# Patient Record
Sex: FEMALE | Race: WHITE | Employment: OTHER | URBAN - METROPOLITAN AREA
[De-identification: names, ages, dates, MRNs, and addresses within clinical notes are randomized per-mention and may not be internally consistent; named-entity substitution may affect disease eponyms.]

---

## 2020-06-20 ENCOUNTER — APPOINTMENT (OUTPATIENT)
Dept: GENERAL RADIOLOGY | Age: 55
DRG: 115 | End: 2020-06-20
Payer: MEDICAID

## 2020-06-20 ENCOUNTER — APPOINTMENT (OUTPATIENT)
Dept: CT IMAGING | Age: 55
DRG: 115 | End: 2020-06-20
Payer: MEDICAID

## 2020-06-20 ENCOUNTER — APPOINTMENT (OUTPATIENT)
Dept: MRI IMAGING | Age: 55
DRG: 115 | End: 2020-06-20
Payer: MEDICAID

## 2020-06-20 ENCOUNTER — HOSPITAL ENCOUNTER (INPATIENT)
Age: 55
LOS: 5 days | Discharge: HOME HEALTH | DRG: 115 | End: 2020-06-25
Attending: EMERGENCY MEDICINE | Admitting: PLASTIC SURGERY
Payer: MEDICAID

## 2020-06-20 PROBLEM — T14.90XA TRAUMA: Status: ACTIVE | Noted: 2020-06-20

## 2020-06-20 LAB
ABO/RH: NORMAL
ACETAMINOPHEN LEVEL: <5 MCG/ML (ref 10–30)
ALBUMIN SERPL-MCNC: 3.6 G/DL (ref 3.5–5.2)
ALP BLD-CCNC: 87 U/L (ref 35–104)
ALT SERPL-CCNC: 15 U/L (ref 0–32)
AMPHETAMINE SCREEN, URINE: NOT DETECTED
ANION GAP SERPL CALCULATED.3IONS-SCNC: 13 MMOL/L (ref 7–16)
ANTIBODY SCREEN: NORMAL
AST SERPL-CCNC: 31 U/L (ref 0–31)
BARBITURATE SCREEN URINE: NOT DETECTED
BASOPHILS ABSOLUTE: 0.02 E9/L (ref 0–0.2)
BASOPHILS RELATIVE PERCENT: 0.1 % (ref 0–2)
BENZODIAZEPINE SCREEN, URINE: NOT DETECTED
BILIRUB SERPL-MCNC: 0.2 MG/DL (ref 0–1.2)
BUN BLDV-MCNC: 5 MG/DL (ref 6–20)
CALCIUM SERPL-MCNC: 8.9 MG/DL (ref 8.6–10.2)
CANNABINOID SCREEN URINE: POSITIVE
CHLORIDE BLD-SCNC: 84 MMOL/L (ref 98–107)
CO2: 25 MMOL/L (ref 22–29)
COCAINE METABOLITE SCREEN URINE: NOT DETECTED
CREAT SERPL-MCNC: 0.5 MG/DL (ref 0.5–1)
EOSINOPHILS ABSOLUTE: 0.02 E9/L (ref 0.05–0.5)
EOSINOPHILS RELATIVE PERCENT: 0.1 % (ref 0–6)
ETHANOL: <10 MG/DL (ref 0–0.08)
FENTANYL SCREEN, URINE: POSITIVE
GFR AFRICAN AMERICAN: >60
GFR NON-AFRICAN AMERICAN: >60 ML/MIN/1.73
GLUCOSE BLD-MCNC: 132 MG/DL (ref 74–99)
HCT VFR BLD CALC: 27.1 % (ref 34–48)
HEMOGLOBIN: 9.4 G/DL (ref 11.5–15.5)
IMMATURE GRANULOCYTES #: 0.12 E9/L
IMMATURE GRANULOCYTES %: 0.8 % (ref 0–5)
LACTIC ACID, SEPSIS: 0.8 MMOL/L (ref 0.5–1.9)
LYMPHOCYTES ABSOLUTE: 0.89 E9/L (ref 1.5–4)
LYMPHOCYTES RELATIVE PERCENT: 5.8 % (ref 20–42)
Lab: ABNORMAL
MCH RBC QN AUTO: 28.5 PG (ref 26–35)
MCHC RBC AUTO-ENTMCNC: 34.7 % (ref 32–34.5)
MCV RBC AUTO: 82.1 FL (ref 80–99.9)
METHADONE SCREEN, URINE: NOT DETECTED
MONOCYTES ABSOLUTE: 0.47 E9/L (ref 0.1–0.95)
MONOCYTES RELATIVE PERCENT: 3.1 % (ref 2–12)
NEUTROPHILS ABSOLUTE: 13.85 E9/L (ref 1.8–7.3)
NEUTROPHILS RELATIVE PERCENT: 90.1 % (ref 43–80)
OPIATE SCREEN URINE: NOT DETECTED
OXYCODONE URINE: NOT DETECTED
PDW BLD-RTO: 14.3 FL (ref 11.5–15)
PHENCYCLIDINE SCREEN URINE: NOT DETECTED
PLATELET # BLD: 430 E9/L (ref 130–450)
PMV BLD AUTO: 8.3 FL (ref 7–12)
POTASSIUM SERPL-SCNC: 3.9 MMOL/L (ref 3.5–5)
RBC # BLD: 3.3 E12/L (ref 3.5–5.5)
SALICYLATE, SERUM: <0.3 MG/DL (ref 0–30)
SODIUM BLD-SCNC: 122 MMOL/L (ref 132–146)
TOTAL PROTEIN: 6.6 G/DL (ref 6.4–8.3)
TRICYCLIC ANTIDEPRESSANTS SCREEN SERUM: NEGATIVE NG/ML
WBC # BLD: 15.4 E9/L (ref 4.5–11.5)

## 2020-06-20 PROCEDURE — 6360000002 HC RX W HCPCS: Performed by: EMERGENCY MEDICINE

## 2020-06-20 PROCEDURE — 86850 RBC ANTIBODY SCREEN: CPT

## 2020-06-20 PROCEDURE — 83605 ASSAY OF LACTIC ACID: CPT

## 2020-06-20 PROCEDURE — 2580000003 HC RX 258: Performed by: RADIOLOGY

## 2020-06-20 PROCEDURE — 85025 COMPLETE CBC W/AUTO DIFF WBC: CPT

## 2020-06-20 PROCEDURE — 86901 BLOOD TYPING SEROLOGIC RH(D): CPT

## 2020-06-20 PROCEDURE — 70486 CT MAXILLOFACIAL W/O DYE: CPT

## 2020-06-20 PROCEDURE — 72170 X-RAY EXAM OF PELVIS: CPT

## 2020-06-20 PROCEDURE — 6360000004 HC RX CONTRAST MEDICATION: Performed by: RADIOLOGY

## 2020-06-20 PROCEDURE — 73030 X-RAY EXAM OF SHOULDER: CPT

## 2020-06-20 PROCEDURE — 71045 X-RAY EXAM CHEST 1 VIEW: CPT

## 2020-06-20 PROCEDURE — 71260 CT THORAX DX C+: CPT

## 2020-06-20 PROCEDURE — 70450 CT HEAD/BRAIN W/O DYE: CPT

## 2020-06-20 PROCEDURE — 6370000000 HC RX 637 (ALT 250 FOR IP): Performed by: STUDENT IN AN ORGANIZED HEALTH CARE EDUCATION/TRAINING PROGRAM

## 2020-06-20 PROCEDURE — 96374 THER/PROPH/DIAG INJ IV PUSH: CPT

## 2020-06-20 PROCEDURE — 99285 EMERGENCY DEPT VISIT HI MDM: CPT

## 2020-06-20 PROCEDURE — 86900 BLOOD TYPING SEROLOGIC ABO: CPT

## 2020-06-20 PROCEDURE — 87040 BLOOD CULTURE FOR BACTERIA: CPT

## 2020-06-20 PROCEDURE — 6370000000 HC RX 637 (ALT 250 FOR IP): Performed by: EMERGENCY MEDICINE

## 2020-06-20 PROCEDURE — 99254 IP/OBS CNSLTJ NEW/EST MOD 60: CPT | Performed by: PLASTIC SURGERY

## 2020-06-20 PROCEDURE — 2060000000 HC ICU INTERMEDIATE R&B

## 2020-06-20 PROCEDURE — 96375 TX/PRO/DX INJ NEW DRUG ADDON: CPT

## 2020-06-20 PROCEDURE — 72146 MRI CHEST SPINE W/O DYE: CPT

## 2020-06-20 PROCEDURE — 2580000003 HC RX 258: Performed by: EMERGENCY MEDICINE

## 2020-06-20 PROCEDURE — G0480 DRUG TEST DEF 1-7 CLASSES: HCPCS

## 2020-06-20 PROCEDURE — 74177 CT ABD & PELVIS W/CONTRAST: CPT

## 2020-06-20 PROCEDURE — 2580000003 HC RX 258: Performed by: STUDENT IN AN ORGANIZED HEALTH CARE EDUCATION/TRAINING PROGRAM

## 2020-06-20 PROCEDURE — 2700000000 HC OXYGEN THERAPY PER DAY

## 2020-06-20 PROCEDURE — 80307 DRUG TEST PRSMV CHEM ANLYZR: CPT

## 2020-06-20 PROCEDURE — 80053 COMPREHEN METABOLIC PANEL: CPT

## 2020-06-20 RX ORDER — DIVALPROEX SODIUM 500 MG/1
500 TABLET, DELAYED RELEASE ORAL 2 TIMES DAILY
COMMUNITY

## 2020-06-20 RX ORDER — LIDOCAINE 4 G/G
1 PATCH TOPICAL DAILY
Status: DISCONTINUED | OUTPATIENT
Start: 2020-06-20 | End: 2020-06-25 | Stop reason: HOSPADM

## 2020-06-20 RX ORDER — ONDANSETRON 4 MG/1
4 TABLET, FILM COATED ORAL EVERY 8 HOURS PRN
COMMUNITY

## 2020-06-20 RX ORDER — ONDANSETRON 2 MG/ML
4 INJECTION INTRAMUSCULAR; INTRAVENOUS EVERY 6 HOURS PRN
Status: DISCONTINUED | OUTPATIENT
Start: 2020-06-20 | End: 2020-06-25 | Stop reason: HOSPADM

## 2020-06-20 RX ORDER — LISINOPRIL 5 MG/1
5 TABLET ORAL DAILY
COMMUNITY

## 2020-06-20 RX ORDER — ACETAMINOPHEN 325 MG/1
650 TABLET ORAL EVERY 6 HOURS SCHEDULED
Status: DISCONTINUED | OUTPATIENT
Start: 2020-06-20 | End: 2020-06-25 | Stop reason: HOSPADM

## 2020-06-20 RX ORDER — FLUOXETINE HYDROCHLORIDE 20 MG/1
20 CAPSULE ORAL DAILY
COMMUNITY

## 2020-06-20 RX ORDER — SODIUM CHLORIDE 0.9 % (FLUSH) 0.9 %
10 SYRINGE (ML) INJECTION PRN
Status: DISCONTINUED | OUTPATIENT
Start: 2020-06-20 | End: 2020-06-25 | Stop reason: HOSPADM

## 2020-06-20 RX ORDER — OXYCODONE HYDROCHLORIDE 5 MG/1
5 TABLET ORAL EVERY 4 HOURS PRN
Status: DISCONTINUED | OUTPATIENT
Start: 2020-06-20 | End: 2020-06-25 | Stop reason: HOSPADM

## 2020-06-20 RX ORDER — OXYCODONE HYDROCHLORIDE 10 MG/1
10 TABLET ORAL EVERY 4 HOURS PRN
Status: DISCONTINUED | OUTPATIENT
Start: 2020-06-20 | End: 2020-06-25 | Stop reason: HOSPADM

## 2020-06-20 RX ORDER — DOXYCYCLINE HYCLATE 100 MG/1
100 CAPSULE ORAL ONCE
Status: COMPLETED | OUTPATIENT
Start: 2020-06-20 | End: 2020-06-20

## 2020-06-20 RX ORDER — CLONAZEPAM 0.5 MG/1
0.5 TABLET ORAL NIGHTLY
COMMUNITY

## 2020-06-20 RX ORDER — SODIUM CHLORIDE 0.9 % (FLUSH) 0.9 %
10 SYRINGE (ML) INJECTION
Status: COMPLETED | OUTPATIENT
Start: 2020-06-20 | End: 2020-06-20

## 2020-06-20 RX ORDER — METHOCARBAMOL 500 MG/1
1000 TABLET, FILM COATED ORAL 4 TIMES DAILY
Status: DISCONTINUED | OUTPATIENT
Start: 2020-06-20 | End: 2020-06-25 | Stop reason: HOSPADM

## 2020-06-20 RX ORDER — VALACYCLOVIR HYDROCHLORIDE 1 G/1
2000 TABLET, FILM COATED ORAL 2 TIMES DAILY
COMMUNITY

## 2020-06-20 RX ORDER — POLYETHYLENE GLYCOL 3350 17 G/17G
17 POWDER, FOR SOLUTION ORAL DAILY PRN
Status: DISCONTINUED | OUTPATIENT
Start: 2020-06-20 | End: 2020-06-25 | Stop reason: HOSPADM

## 2020-06-20 RX ORDER — KETOROLAC TROMETHAMINE 30 MG/ML
15 INJECTION, SOLUTION INTRAMUSCULAR; INTRAVENOUS ONCE
Status: COMPLETED | OUTPATIENT
Start: 2020-06-20 | End: 2020-06-20

## 2020-06-20 RX ORDER — MONTELUKAST SODIUM 4 MG/1
1 TABLET, CHEWABLE ORAL 2 TIMES DAILY
COMMUNITY

## 2020-06-20 RX ORDER — SODIUM CHLORIDE 0.9 % (FLUSH) 0.9 %
10 SYRINGE (ML) INJECTION EVERY 12 HOURS SCHEDULED
Status: DISCONTINUED | OUTPATIENT
Start: 2020-06-20 | End: 2020-06-25 | Stop reason: HOSPADM

## 2020-06-20 RX ORDER — PROMETHAZINE HYDROCHLORIDE 25 MG/1
12.5 TABLET ORAL EVERY 6 HOURS PRN
Status: DISCONTINUED | OUTPATIENT
Start: 2020-06-20 | End: 2020-06-24

## 2020-06-20 RX ORDER — PANTOPRAZOLE SODIUM 40 MG/1
40 TABLET, DELAYED RELEASE ORAL 2 TIMES DAILY
COMMUNITY

## 2020-06-20 RX ORDER — DIAPER,BRIEF,INFANT-TODD,DISP
EACH MISCELLANEOUS 3 TIMES DAILY
Status: DISCONTINUED | OUTPATIENT
Start: 2020-06-20 | End: 2020-06-25 | Stop reason: HOSPADM

## 2020-06-20 RX ORDER — FERROUS SULFATE 325(65) MG
325 TABLET ORAL 2 TIMES DAILY
COMMUNITY

## 2020-06-20 RX ORDER — LORATADINE 10 MG/1
10 CAPSULE, LIQUID FILLED ORAL DAILY PRN
COMMUNITY

## 2020-06-20 RX ORDER — GUAIFENESIN 400 MG/1
400 TABLET ORAL 4 TIMES DAILY PRN
COMMUNITY

## 2020-06-20 RX ORDER — LAMOTRIGINE 200 MG/1
200 TABLET ORAL 2 TIMES DAILY
COMMUNITY

## 2020-06-20 RX ORDER — SODIUM CHLORIDE 9 MG/ML
INJECTION, SOLUTION INTRAVENOUS CONTINUOUS
Status: DISCONTINUED | OUTPATIENT
Start: 2020-06-20 | End: 2020-06-21

## 2020-06-20 RX ORDER — IBUPROFEN 800 MG/1
800 TABLET ORAL EVERY 8 HOURS PRN
COMMUNITY

## 2020-06-20 RX ORDER — OLANZAPINE 15 MG/1
15 TABLET ORAL NIGHTLY
COMMUNITY

## 2020-06-20 RX ORDER — SENNA AND DOCUSATE SODIUM 50; 8.6 MG/1; MG/1
1 TABLET, FILM COATED ORAL 2 TIMES DAILY
Status: DISCONTINUED | OUTPATIENT
Start: 2020-06-20 | End: 2020-06-25 | Stop reason: HOSPADM

## 2020-06-20 RX ORDER — CETIRIZINE HYDROCHLORIDE 10 MG/1
10 TABLET ORAL DAILY
COMMUNITY

## 2020-06-20 RX ORDER — MIRTAZAPINE 15 MG/1
15 TABLET, FILM COATED ORAL NIGHTLY
COMMUNITY

## 2020-06-20 RX ORDER — CEPHALEXIN 500 MG/1
500 CAPSULE ORAL EVERY 6 HOURS SCHEDULED
Status: COMPLETED | OUTPATIENT
Start: 2020-06-20 | End: 2020-06-25

## 2020-06-20 RX ADMIN — CEPHALEXIN 500 MG: 500 CAPSULE ORAL at 20:43

## 2020-06-20 RX ADMIN — METHOCARBAMOL TABLETS 1000 MG: 500 TABLET, COATED ORAL at 17:26

## 2020-06-20 RX ADMIN — ACETAMINOPHEN 650 MG: 325 TABLET ORAL at 17:26

## 2020-06-20 RX ADMIN — OXYCODONE 5 MG: 5 TABLET ORAL at 16:14

## 2020-06-20 RX ADMIN — BACITRACIN ZINC: 500 OINTMENT TOPICAL at 20:42

## 2020-06-20 RX ADMIN — METHOCARBAMOL TABLETS 1000 MG: 500 TABLET, COATED ORAL at 20:43

## 2020-06-20 RX ADMIN — DOXYCYCLINE HYCLATE 100 MG: 100 CAPSULE ORAL at 11:28

## 2020-06-20 RX ADMIN — IOPAMIDOL 110 ML: 755 INJECTION, SOLUTION INTRAVENOUS at 12:20

## 2020-06-20 RX ADMIN — SODIUM CHLORIDE: 9 INJECTION, SOLUTION INTRAVENOUS at 18:14

## 2020-06-20 RX ADMIN — KETOROLAC TROMETHAMINE 15 MG: 30 INJECTION, SOLUTION INTRAMUSCULAR at 09:15

## 2020-06-20 RX ADMIN — SODIUM CHLORIDE, PRESERVATIVE FREE 10 ML: 5 INJECTION INTRAVENOUS at 11:22

## 2020-06-20 RX ADMIN — Medication 10 ML: at 12:21

## 2020-06-20 RX ADMIN — DOCUSATE SODIUM 50MG AND SENNOSIDES 8.6MG 1 TABLET: 8.6; 5 TABLET, FILM COATED ORAL at 20:42

## 2020-06-20 RX ADMIN — CEFTRIAXONE 2 G: 2 INJECTION, POWDER, FOR SOLUTION INTRAMUSCULAR; INTRAVENOUS at 11:25

## 2020-06-20 RX ADMIN — SODIUM CHLORIDE, PRESERVATIVE FREE 10 ML: 5 INJECTION INTRAVENOUS at 09:16

## 2020-06-20 RX ADMIN — BACITRACIN ZINC: 500 OINTMENT TOPICAL at 18:29

## 2020-06-20 SDOH — HEALTH STABILITY: MENTAL HEALTH: HOW OFTEN DO YOU HAVE A DRINK CONTAINING ALCOHOL?: NEVER

## 2020-06-20 ASSESSMENT — PAIN SCALES - GENERAL
PAINLEVEL_OUTOF10: 10
PAINLEVEL_OUTOF10: 9
PAINLEVEL_OUTOF10: 10
PAINLEVEL_OUTOF10: 10
PAINLEVEL_OUTOF10: 0
PAINLEVEL_OUTOF10: 10

## 2020-06-20 ASSESSMENT — PAIN DESCRIPTION - LOCATION
LOCATION: EYE;HEAD
LOCATION: EYE;HEAD

## 2020-06-20 ASSESSMENT — PAIN DESCRIPTION - ONSET
ONSET: ON-GOING
ONSET: ON-GOING

## 2020-06-20 ASSESSMENT — PAIN DESCRIPTION - DESCRIPTORS
DESCRIPTORS: ACHING;CONSTANT;DISCOMFORT
DESCRIPTORS: ACHING;CONSTANT;DISCOMFORT

## 2020-06-20 ASSESSMENT — PAIN DESCRIPTION - FREQUENCY
FREQUENCY: CONTINUOUS
FREQUENCY: CONTINUOUS

## 2020-06-20 ASSESSMENT — PAIN DESCRIPTION - PAIN TYPE
TYPE: ACUTE PAIN
TYPE: ACUTE PAIN

## 2020-06-20 NOTE — ED PROVIDER NOTES
change  Repeat physical examination is not changed    Time: 0800  Re-evaluation. Patients symptoms show no change  Repeat physical examination is not changed      Sepsis Re-examination  6/20/20   0900am EDT          Vital Signs:   Vitals:    06/20/20 2355 06/21/20 0515 06/21/20 0800 06/21/20 0815   BP: (!) 174/80 (!) 155/81 125/60    Pulse: 81 81 82    Resp: 20 18 16    Temp: 98.8 °F (37.1 °C) 98.5 °F (36.9 °C) 96.6 °F (35.9 °C)    TempSrc: Axillary Temporal Temporal    SpO2: 98% 99%  99%   Weight:  146 lb (66.2 kg)     Height:  5' 5\" (1.651 m)       Card/Pulm:  Rhythm: normal rate. Heart Sounds: Normal S1, S2. rhonchi. Capillary Refill: normal.  Radial Pulse:  present 2+. Skin:  Warm. This patient's ED course included: a personal history and physicial examination, multiple bedside re-evaluations, IV medications, cardiac monitoring, continuous pulse oximetry and complex medical decision making and emergency management    This patient has remained hemodynamically stable and been closely monitored during their ED course. Consultations:  Trauma      Critical Care: Please note that the withdrawal or failure to initiate urgent interventions for this patient would likely result in a life threatening deterioration or permanent disability. Accordingly this patient received 31 minutes of critical care time, excluding separately billable procedures. Counseling: The emergency provider has spoken with the patient and discussed todays results, in addition to providing specific details for the plan of care and counseling regarding the diagnosis and prognosis. Questions are answered at this time and they are agreeable with the plan.       --------------------------------- IMPRESSION AND DISPOSITION ---------------------------------    IMPRESSION  1. Trauma    2. Facial injury, initial encounter    3.  Closed fracture of thoracic vertebra, unspecified fracture morphology, unspecified thoracic

## 2020-06-20 NOTE — CONSULTS
PLASTIC SURGERY  CONSULT NOTE  6/20/2020    Physician Consulted: Dr. Sophia Turcios  Reason for Consult: Left orbital wall fracture, nasal bone fractures  Referring Physician: Dr. Anibal GTZ  Montrell Vanessa is a 50 y.o. female who presents for evaluation of left orbital wall fracture, nasal bone fractures. Patient fell down 5 carpeted steps. She states she doesn't remember the fall she had gotten up to get ice cream last night and the next thing she knew she was on the floor. Does not think she was dizzy prior to fall. Patient is somnolent but arousable. She states everything hurts. She was taken to [facility] where she was found to have a left orbital fracture, nasal bone fractures, and a T9 compression fracture. She was transferred to [facility]. Due to her somnolence a repeat CT head was obtained, negative for intracranial hemorrhage. Patient complains of mild blurry vision left eye. Denies diplopia. No past medical history on file. No past surgical history on file. Medications Prior to Admission:    Prior to Admission medications    Not on File       Allergies no known allergies    No family history on file. Social History     Tobacco Use    Smoking status: Not on file   Substance Use Topics    Alcohol use: Not on file    Drug use: Not on file         Review of Systems   General ROS: negative  Hematological and Lymphatic ROS: negative  Respiratory ROS: negative  Cardiovascular ROS: negative  Gastrointestinal ROS: negative  Genito-Urinary ROS: negative  Musculoskeletal ROS: pain every where      PHYSICAL EXAM:    Vitals:    06/20/20 1000   BP: (!) 155/90   Pulse: 80   Resp: 16   Temp:    SpO2: 94%       General Appearance:  Awake, alert, oriented, in no acute distress  Skin:  Ecchymosis and edema periorbital  Head/face:  Laceration to forehead s/p repair with nylon sutures, bilateral periorbital edema and ecchymosis. Nasal ecchymosis, tender to palpation.   Eyes:  PERRL, H test intact, minimal pain left NUMBER OF IMAGES:  273 INDICATION:  trauma, fall trauma, fall COMPARISON: None Technique:  Spiral CT acquisition of the chest from the thoracic inlet to the upper abdomen with contrast. Contrast: 110 mL of Isovue-370 injected intravenously. CT Dose-Length Product:  298 mGy*cm CT Dose Reduction Employed: Yes  RESULT: Limitations:  None. Lines, tubes, and devices:  None. Lung parenchyma and pleura: Central airways are patent. There are scattered centrilobular nodules and groundglass opacities within the right lung, and mostly within the right lower lobe. These appear to have an infectious/inflammatory appearance. Alternatively, aspiration could account for these findings. For reference, nodule in the right lower lobe measures 7 mm (series 3 image 41), and within the right upper lobe measures 6 mm (series 3 image 10). There is bibasilar atelectasis. No pleural effusion or pneumothorax. Thoracic inlet, heart, and mediastinum:  Visualized thyroid is unremarkable. No lymphadenopathy in the axillary, mediastinal, or hilar regions. The thoracic aorta and main pulmonary artery are normal in caliber. The cardiac chambers are normal in size. No coronary artery atherosclerotic calcifications are noted, although the study is not optimized for coronary assessment. No pericardial effusion or thickening. Bones and soft tissues: There is a compression deformity involving the T9 vertebral body, with approximately 50% loss of height. There is involvement of the posterior elements on the right, with fracture extending into the pars interarticulares. There are multiple left-sided rib fractures of the left third, fourth, fifth, sixth ribs. Upper abdomen: This will be dictated separately      Compression deformity of the T9 vertebral body, with approximately 50% loss of height. There is involvement of the posterior elements on the right, with fracture extending into the pars interarticulares.  Multiple left-sided rib fractures from the

## 2020-06-20 NOTE — ED NOTES
Set one of two blood culture obtained by david sheppard via right antecubital at 1110 and set two of two obtained at 1120 via right hand by valarie, rn-pt tolerated well. Alert to verbal stimuli but smi unreliable at this time.       Shiv Joiner RN  06/20/20 1120

## 2020-06-20 NOTE — PROGRESS NOTES
Cervical Spine C Collar Clearance -  Patient CT Spine Imaging normal.  Patient does not complain of Cervical Spine tenderness upon palpation. Patients C-Spine ranged. C-spine clear, no need for C-Collar.     Electronically signed by Yesenia Padgett MD on 6/20/2020 at 1:12 PM

## 2020-06-21 LAB
ANION GAP SERPL CALCULATED.3IONS-SCNC: 11 MMOL/L (ref 7–16)
BASOPHILS ABSOLUTE: 0.02 E9/L (ref 0–0.2)
BASOPHILS RELATIVE PERCENT: 0.3 % (ref 0–2)
BUN BLDV-MCNC: 3 MG/DL (ref 6–20)
CALCIUM SERPL-MCNC: 9 MG/DL (ref 8.6–10.2)
CHLORIDE BLD-SCNC: 94 MMOL/L (ref 98–107)
CO2: 24 MMOL/L (ref 22–29)
CREAT SERPL-MCNC: 0.5 MG/DL (ref 0.5–1)
EOSINOPHILS ABSOLUTE: 0.09 E9/L (ref 0.05–0.5)
EOSINOPHILS RELATIVE PERCENT: 1.4 % (ref 0–6)
GFR AFRICAN AMERICAN: >60
GFR NON-AFRICAN AMERICAN: >60 ML/MIN/1.73
GLUCOSE BLD-MCNC: 96 MG/DL (ref 74–99)
HCT VFR BLD CALC: 30.7 % (ref 34–48)
HEMOGLOBIN: 9.8 G/DL (ref 11.5–15.5)
IMMATURE GRANULOCYTES #: 0.04 E9/L
IMMATURE GRANULOCYTES %: 0.6 % (ref 0–5)
LYMPHOCYTES ABSOLUTE: 1.21 E9/L (ref 1.5–4)
LYMPHOCYTES RELATIVE PERCENT: 18.8 % (ref 20–42)
MCH RBC QN AUTO: 27.5 PG (ref 26–35)
MCHC RBC AUTO-ENTMCNC: 31.9 % (ref 32–34.5)
MCV RBC AUTO: 86.2 FL (ref 80–99.9)
MONOCYTES ABSOLUTE: 0.52 E9/L (ref 0.1–0.95)
MONOCYTES RELATIVE PERCENT: 8.1 % (ref 2–12)
NEUTROPHILS ABSOLUTE: 4.55 E9/L (ref 1.8–7.3)
NEUTROPHILS RELATIVE PERCENT: 70.8 % (ref 43–80)
PDW BLD-RTO: 14.6 FL (ref 11.5–15)
PLATELET # BLD: 398 E9/L (ref 130–450)
PMV BLD AUTO: 8.5 FL (ref 7–12)
POTASSIUM REFLEX MAGNESIUM: 4.1 MMOL/L (ref 3.5–5)
RBC # BLD: 3.56 E12/L (ref 3.5–5.5)
SODIUM BLD-SCNC: 129 MMOL/L (ref 132–146)
SODIUM BLD-SCNC: 131 MMOL/L (ref 132–146)
SODIUM BLD-SCNC: 133 MMOL/L (ref 132–146)
WBC # BLD: 6.4 E9/L (ref 4.5–11.5)

## 2020-06-21 PROCEDURE — 2580000003 HC RX 258: Performed by: SURGERY

## 2020-06-21 PROCEDURE — 2580000003 HC RX 258: Performed by: STUDENT IN AN ORGANIZED HEALTH CARE EDUCATION/TRAINING PROGRAM

## 2020-06-21 PROCEDURE — 6360000002 HC RX W HCPCS: Performed by: STUDENT IN AN ORGANIZED HEALTH CARE EDUCATION/TRAINING PROGRAM

## 2020-06-21 PROCEDURE — 36415 COLL VENOUS BLD VENIPUNCTURE: CPT

## 2020-06-21 PROCEDURE — 2060000000 HC ICU INTERMEDIATE R&B

## 2020-06-21 PROCEDURE — 94640 AIRWAY INHALATION TREATMENT: CPT

## 2020-06-21 PROCEDURE — 2700000000 HC OXYGEN THERAPY PER DAY

## 2020-06-21 PROCEDURE — 84295 ASSAY OF SERUM SODIUM: CPT

## 2020-06-21 PROCEDURE — 80048 BASIC METABOLIC PNL TOTAL CA: CPT

## 2020-06-21 PROCEDURE — 6370000000 HC RX 637 (ALT 250 FOR IP): Performed by: SURGERY

## 2020-06-21 PROCEDURE — 99233 SBSQ HOSP IP/OBS HIGH 50: CPT | Performed by: SURGERY

## 2020-06-21 PROCEDURE — 6360000002 HC RX W HCPCS: Performed by: SURGERY

## 2020-06-21 PROCEDURE — 6370000000 HC RX 637 (ALT 250 FOR IP): Performed by: STUDENT IN AN ORGANIZED HEALTH CARE EDUCATION/TRAINING PROGRAM

## 2020-06-21 PROCEDURE — 94664 DEMO&/EVAL PT USE INHALER: CPT

## 2020-06-21 PROCEDURE — 85025 COMPLETE CBC W/AUTO DIFF WBC: CPT

## 2020-06-21 RX ORDER — MIRTAZAPINE 15 MG/1
15 TABLET, FILM COATED ORAL NIGHTLY
Status: DISCONTINUED | OUTPATIENT
Start: 2020-06-21 | End: 2020-06-25 | Stop reason: HOSPADM

## 2020-06-21 RX ORDER — SODIUM CHLORIDE, SODIUM LACTATE, POTASSIUM CHLORIDE, CALCIUM CHLORIDE 600; 310; 30; 20 MG/100ML; MG/100ML; MG/100ML; MG/100ML
INJECTION, SOLUTION INTRAVENOUS CONTINUOUS
Status: DISCONTINUED | OUTPATIENT
Start: 2020-06-21 | End: 2020-06-22

## 2020-06-21 RX ORDER — DIVALPROEX SODIUM 500 MG/1
500 TABLET, DELAYED RELEASE ORAL 2 TIMES DAILY
Status: DISCONTINUED | OUTPATIENT
Start: 2020-06-21 | End: 2020-06-25 | Stop reason: HOSPADM

## 2020-06-21 RX ORDER — FLUOXETINE HYDROCHLORIDE 20 MG/1
20 CAPSULE ORAL DAILY
Status: DISCONTINUED | OUTPATIENT
Start: 2020-06-21 | End: 2020-06-25 | Stop reason: HOSPADM

## 2020-06-21 RX ORDER — LAMOTRIGINE 100 MG/1
200 TABLET ORAL 2 TIMES DAILY
Status: DISCONTINUED | OUTPATIENT
Start: 2020-06-21 | End: 2020-06-25 | Stop reason: HOSPADM

## 2020-06-21 RX ORDER — CHOLESTYRAMINE LIGHT 4 G/5.7G
4 POWDER, FOR SUSPENSION ORAL DAILY
Status: DISCONTINUED | OUTPATIENT
Start: 2020-06-21 | End: 2020-06-25 | Stop reason: HOSPADM

## 2020-06-21 RX ORDER — CLONAZEPAM 0.5 MG/1
0.5 TABLET ORAL NIGHTLY
Status: DISCONTINUED | OUTPATIENT
Start: 2020-06-21 | End: 2020-06-25 | Stop reason: HOSPADM

## 2020-06-21 RX ORDER — OLANZAPINE 5 MG/1
15 TABLET ORAL NIGHTLY
Status: DISCONTINUED | OUTPATIENT
Start: 2020-06-21 | End: 2020-06-25 | Stop reason: HOSPADM

## 2020-06-21 RX ORDER — CETIRIZINE HYDROCHLORIDE 10 MG/1
10 TABLET ORAL DAILY
Status: DISCONTINUED | OUTPATIENT
Start: 2020-06-21 | End: 2020-06-25 | Stop reason: HOSPADM

## 2020-06-21 RX ORDER — PANTOPRAZOLE SODIUM 40 MG/1
40 TABLET, DELAYED RELEASE ORAL 2 TIMES DAILY
Status: DISCONTINUED | OUTPATIENT
Start: 2020-06-21 | End: 2020-06-25 | Stop reason: HOSPADM

## 2020-06-21 RX ORDER — ACYCLOVIR 800 MG/1
800 TABLET ORAL
Status: DISCONTINUED | OUTPATIENT
Start: 2020-06-21 | End: 2020-06-25 | Stop reason: HOSPADM

## 2020-06-21 RX ADMIN — ONDANSETRON 4 MG: 2 INJECTION INTRAMUSCULAR; INTRAVENOUS at 19:22

## 2020-06-21 RX ADMIN — CEPHALEXIN 500 MG: 500 CAPSULE ORAL at 12:34

## 2020-06-21 RX ADMIN — PANTOPRAZOLE SODIUM 40 MG: 40 TABLET, DELAYED RELEASE ORAL at 23:18

## 2020-06-21 RX ADMIN — CLONAZEPAM 0.5 MG: 0.5 TABLET ORAL at 23:17

## 2020-06-21 RX ADMIN — OXYCODONE HYDROCHLORIDE 10 MG: 10 TABLET ORAL at 21:59

## 2020-06-21 RX ADMIN — ACETAMINOPHEN 650 MG: 325 TABLET ORAL at 01:18

## 2020-06-21 RX ADMIN — ACETAMINOPHEN 650 MG: 325 TABLET ORAL at 12:34

## 2020-06-21 RX ADMIN — BACITRACIN ZINC: 500 OINTMENT TOPICAL at 09:50

## 2020-06-21 RX ADMIN — ACYCLOVIR 800 MG: 800 TABLET ORAL at 19:45

## 2020-06-21 RX ADMIN — FLUOXETINE HYDROCHLORIDE 20 MG: 20 CAPSULE ORAL at 23:18

## 2020-06-21 RX ADMIN — IPRATROPIUM BROMIDE 0.5 MG: 0.5 SOLUTION RESPIRATORY (INHALATION) at 21:21

## 2020-06-21 RX ADMIN — OXYCODONE HYDROCHLORIDE 10 MG: 10 TABLET ORAL at 10:50

## 2020-06-21 RX ADMIN — METHOCARBAMOL TABLETS 1000 MG: 500 TABLET, COATED ORAL at 18:10

## 2020-06-21 RX ADMIN — BACITRACIN ZINC: 500 OINTMENT TOPICAL at 14:52

## 2020-06-21 RX ADMIN — ACETAMINOPHEN 650 MG: 325 TABLET ORAL at 23:17

## 2020-06-21 RX ADMIN — OXYCODONE HYDROCHLORIDE 10 MG: 10 TABLET ORAL at 17:19

## 2020-06-21 RX ADMIN — ACETAMINOPHEN 650 MG: 325 TABLET ORAL at 18:10

## 2020-06-21 RX ADMIN — ACETAMINOPHEN 650 MG: 325 TABLET ORAL at 05:28

## 2020-06-21 RX ADMIN — CHOLESTYRAMINE 4 G: 4 POWDER, FOR SUSPENSION ORAL at 23:19

## 2020-06-21 RX ADMIN — SODIUM CHLORIDE, PRESERVATIVE FREE 10 ML: 5 INJECTION INTRAVENOUS at 23:19

## 2020-06-21 RX ADMIN — METHOCARBAMOL TABLETS 1000 MG: 500 TABLET, COATED ORAL at 23:18

## 2020-06-21 RX ADMIN — CEPHALEXIN 500 MG: 500 CAPSULE ORAL at 05:28

## 2020-06-21 RX ADMIN — CEPHALEXIN 500 MG: 500 CAPSULE ORAL at 01:18

## 2020-06-21 RX ADMIN — LAMOTRIGINE 200 MG: 100 TABLET ORAL at 23:18

## 2020-06-21 RX ADMIN — DOCUSATE SODIUM 50MG AND SENNOSIDES 8.6MG 1 TABLET: 8.6; 5 TABLET, FILM COATED ORAL at 23:16

## 2020-06-21 RX ADMIN — OXYCODONE HYDROCHLORIDE 10 MG: 10 TABLET ORAL at 06:34

## 2020-06-21 RX ADMIN — METHOCARBAMOL TABLETS 1000 MG: 500 TABLET, COATED ORAL at 09:50

## 2020-06-21 RX ADMIN — CETIRIZINE HYDROCHLORIDE 10 MG: 10 TABLET, FILM COATED ORAL at 23:18

## 2020-06-21 RX ADMIN — CEPHALEXIN 500 MG: 500 CAPSULE ORAL at 23:17

## 2020-06-21 RX ADMIN — CEPHALEXIN 500 MG: 500 CAPSULE ORAL at 18:10

## 2020-06-21 RX ADMIN — BACITRACIN ZINC: 500 OINTMENT TOPICAL at 23:19

## 2020-06-21 RX ADMIN — OXYCODONE HYDROCHLORIDE 10 MG: 10 TABLET ORAL at 01:15

## 2020-06-21 RX ADMIN — OLANZAPINE 15 MG: 5 TABLET, FILM COATED ORAL at 23:18

## 2020-06-21 RX ADMIN — DIVALPROEX SODIUM 500 MG: 250 TABLET, DELAYED RELEASE ORAL at 23:18

## 2020-06-21 RX ADMIN — METHOCARBAMOL TABLETS 1000 MG: 500 TABLET, COATED ORAL at 14:52

## 2020-06-21 RX ADMIN — DOCUSATE SODIUM 50MG AND SENNOSIDES 8.6MG 1 TABLET: 8.6; 5 TABLET, FILM COATED ORAL at 09:50

## 2020-06-21 RX ADMIN — SODIUM CHLORIDE: 9 INJECTION, SOLUTION INTRAVENOUS at 08:27

## 2020-06-21 RX ADMIN — ACYCLOVIR 800 MG: 800 TABLET ORAL at 23:16

## 2020-06-21 RX ADMIN — MIRTAZAPINE 15 MG: 15 TABLET, FILM COATED ORAL at 23:19

## 2020-06-21 RX ADMIN — SODIUM CHLORIDE, POTASSIUM CHLORIDE, SODIUM LACTATE AND CALCIUM CHLORIDE: 600; 310; 30; 20 INJECTION, SOLUTION INTRAVENOUS at 10:55

## 2020-06-21 ASSESSMENT — PAIN SCALES - GENERAL
PAINLEVEL_OUTOF10: 10
PAINLEVEL_OUTOF10: 10
PAINLEVEL_OUTOF10: 9
PAINLEVEL_OUTOF10: 10
PAINLEVEL_OUTOF10: 0
PAINLEVEL_OUTOF10: 10

## 2020-06-21 ASSESSMENT — PAIN DESCRIPTION - LOCATION
LOCATION: EYE;FACE;NOSE
LOCATION: EYE;FACE
LOCATION: EYE;FACE;NOSE
LOCATION: EYE;FACE;NOSE

## 2020-06-21 ASSESSMENT — PAIN DESCRIPTION - ONSET
ONSET: ON-GOING

## 2020-06-21 ASSESSMENT — PAIN DESCRIPTION - ORIENTATION
ORIENTATION: RIGHT;LEFT

## 2020-06-21 ASSESSMENT — PAIN DESCRIPTION - PROGRESSION
CLINICAL_PROGRESSION: NOT CHANGED

## 2020-06-21 ASSESSMENT — PAIN DESCRIPTION - PAIN TYPE
TYPE: ACUTE PAIN

## 2020-06-21 ASSESSMENT — PAIN DESCRIPTION - FREQUENCY
FREQUENCY: CONTINUOUS

## 2020-06-21 ASSESSMENT — PAIN DESCRIPTION - DESCRIPTORS
DESCRIPTORS: ACHING;DISCOMFORT

## 2020-06-21 NOTE — PROGRESS NOTES
Trauma Tertiary Survey    Admit Date: 6/20/2020    Fall distance 5 feet    CC:    Chief Complaint   Patient presents with    Consultation     Pt fell 5-6 steps. Pt is transfer from [facility]. Multiple facial fx and T9 compression fx. Alcohol pre-screening:  How many times in the past year have you had 4 or more drinks in a day?  none    Subjective:       No new complaints overnight feels well. Objective:     Patient Vitals for the past 8 hrs:   BP Temp Temp src Pulse Resp SpO2 Height Weight   06/21/20 0815 -- -- -- -- -- 99 % -- --   06/21/20 0800 125/60 96.6 °F (35.9 °C) Temporal 82 16 -- -- --   06/21/20 0515 (!) 155/81 98.5 °F (36.9 °C) Temporal 81 18 99 % 5' 5\" (1.651 m) 146 lb (66.2 kg)       I/O last 3 completed shifts: In: 843.8 [I.V.:843.8]  Out: 1700 [Urine:1700]  No intake/output data recorded. Radiology:  MRI THORACIC SPINE WO CONTRAST   Final Result   Within the limits of this study, the compression fracture in the T9   vertebral body is likely acute or subacute. If indicated, sagittal   STIR sequence may be obtained for further evaluation. XR SHOULDER RIGHT (MIN 2 VIEWS)   Final Result   No acute osseous abnormality seen. CT Chest W Contrast   Final Result      Compression deformity of the T9 vertebral body, with approximately 50%   loss of height. There is involvement of the posterior elements on the   right, with fracture extending into the pars interarticulares. Multiple left-sided rib fractures from the 3rd through the 6th ribs. Centrilobular nodules and groundglass opacities asymmetrically   involving the right lung, and mostly the right lower lobe. These have   an infectious/inflammatory appearance, although, aspiration could   account for these findings. Follow-up to resolution, to rule out any   underlying process (and insure resolution of these nodules).                   CT ABDOMEN PELVIS W IV CONTRAST Additional Contrast? None   Final Result No CT evidence of acute traumatic abnormality in the abdomen or   pelvis. Soft tissue bruise overlying the right greater trochanter. Scattered enlarged mesenteric lymph nodes. Small fat-containing anterior abdominal wall hernia. XR PELVIS (1-2 VIEWS)   Final Result   Slight irregularity in the right superior and inferior pubic rami may   be indicative of nondisplaced fractures versus artifact from overlying   tissues. XR CHEST PORTABLE   Final Result   Bibasilar opacities. Differential includes atelectasis, infection,   and/or layering pleural effusions. CT Head WO Contrast   Final Result      1. No acute intracranial hemorrhage is identified. 2. Soft tissue edema is noted overlying the left frontal convexity. There is partial opacification of the left frontal sinuses, the   ethmoid air cells, and the right sphenoid sinus. CT Facial Bones WO Contrast   Final Result   Addendum 1 of 1   Addendum:      Multiple nasal fractures are noted. Final          PHYSICAL EXAM:   GCS:  4 - Opens eyes on own   6 - Follows simple motor commands  5 - Alert and oriented    Pupil size: Left 4 mm     Right 4 mm  Pupil reaction: Yes  Wiggles fingers: Left Yes     Right Yes  Wiggles toes: Left Yes     Right Yes  Plantar flexion: Left normal     Right normal    PHYSICAL EXAM  General: No apparent distress, comfortable, orbital ecchymosis bilaterally, pupils round equal reactive regular rate  HEENT: Trachea midline, Pupils equal round  Chest: Respiratory effort was normal with no retractions or use of accessory muscles. Cardiovascular: Normal rhythm  Abdomen:  Soft and non distended.   No tenderness, guarding, rebound, or rigidity  Extremities: Moves all 4 extremeties, No pedal edema, diffuse abrasions    Spine:       Spine Tenderness ROM   Cervical 5 /10 Na   Thoracic 0 /10 Normal   Lumbar 0 /10 Normal     Musculoskeletal:    Joint Tenderness

## 2020-06-21 NOTE — DISCHARGE SUMMARY
centrilobular nodules and groundglass opacities within the right lung, and mostly within the right lower lobe. These appear to have an infectious/inflammatory appearance. Alternatively, aspiration could account for these findings. For reference, nodule in the right lower lobe measures 7 mm (series 3 image 41), and within the right upper lobe measures 6 mm (series 3 image 10). There is bibasilar atelectasis. No pleural effusion or pneumothorax. Thoracic inlet, heart, and mediastinum:  Visualized thyroid is unremarkable. No lymphadenopathy in the axillary, mediastinal, or hilar regions. The thoracic aorta and main pulmonary artery are normal in caliber. The cardiac chambers are normal in size. No coronary artery atherosclerotic calcifications are noted, although the study is not optimized for coronary assessment. No pericardial effusion or thickening. Bones and soft tissues: There is a compression deformity involving the T9 vertebral body, with approximately 50% loss of height. There is involvement of the posterior elements on the right, with fracture extending into the pars interarticulares. There are multiple left-sided rib fractures of the left third, fourth, fifth, sixth ribs. Upper abdomen: This will be dictated separately      Compression deformity of the T9 vertebral body, with approximately 50% loss of height. There is involvement of the posterior elements on the right, with fracture extending into the pars interarticulares. Multiple left-sided rib fractures from the 3rd through the 6th ribs. Centrilobular nodules and groundglass opacities asymmetrically involving the right lung, and mostly the right lower lobe. These have an infectious/inflammatory appearance, although, aspiration could account for these findings. Follow-up to resolution, to rule out any underlying process (and insure resolution of these nodules).      Mri Thoracic Spine Wo Contrast    Result Date: 2020  Patient MRN: [id number] : splenomegaly. Pancreas: No mass or duct dilation. Adrenals: No mass. Kidneys: No enhancing renal mass. No hydronephrosis. No laceration. GI tract: No dilation or wall thickening. Lymph nodes: Scattered enlarged mesenteric lymph nodes. Mesentery/Peritoneum: No ascites or mass. Vasculature: The celiac axis and SMA are patent. The portal vein and branches, splenic vein, SMV, and hepatic veins are patent. No abdominal aortic or iliac artery aneurysm. Pelvis: IUD in place. No ascites. Mason catheter within the urinary bladder. Bones/Soft Tissues: Irregularity in the right inferior pubic ramus/ischium is related to remote injury. Soft tissue bruise within the soft tissues overlying the right greater trochanter. Small fat-containing anterior abdominal wall hernia. Lower thorax: This will be reported separately. No CT evidence of acute traumatic abnormality in the abdomen or pelvis. Soft tissue bruise overlying the right greater trochanter. Scattered enlarged mesenteric lymph nodes. Small fat-containing anterior abdominal wall hernia. Xr Chest Portable    Result Date: 2020  Patient MRN:  [id number] : 1965 Age: 50 years Gender: Female Order Date:  2020 9:45 AM EXAM: XR CHEST PORTABLE NUMBER OF IMAGES:    1 INDICATION:  Trauma, hypoxia Trauma, hypoxia COMPARISON: None TECHNIQUE: AP view of the chest. FINDINGS: Cardiomediastinal silhouette and pulmonary vasculature are normal. Bibasilar opacities noted. No pneumothorax seen. There are degenerative changes in the shoulders and spine. Bibasilar opacities. Differential includes atelectasis, infection, and/or layering pleural effusions.        Discharge Exam:  Gen - no apparent distress   Neuro - Awake, alert, attentive    HEENT - PERRL 3mm, b/l periorbital ecchymosis,   Lungs - non labored, BS clear    Heart - RR   Abdomen - snt   Spine -   Moderate t spine tenderness   Ext- nvi        Disposition: home    In process/preliminary results:  Outstanding Order Results     No orders found from 5/22/2020 to 6/21/2020. Patient Instructions:   Discharge Medication List as of 6/25/2020  3:17 PM           Details   lidocaine 4 % external patch Place 1 patch onto the skin daily, Transdermal, DAILY Starting Fri 6/26/2020, Disp-1 box, R-1, Print      bacitracin zinc 500 UNIT/GM ointment Apply topically 2 times daily. , Disp-30 g, R-1, Print      methocarbamol (ROBAXIN) 500 MG tablet Take 2 tablets by mouth 4 times daily for 10 days, Disp-80 tablet, R-0Print      oxyCODONE HCl (OXY-IR) 10 MG immediate release tablet Take 1 tablet by mouth every 8 hours as needed for Pain for up to 7 days. , Disp-21 tablet, R-0Print              Details   ferrous sulfate (IRON 325) 325 (65 Fe) MG tablet Take 325 mg by mouth 2 times dailyHistorical Med      lamoTRIgine (LAMICTAL) 200 MG tablet Take 200 mg by mouth 2 times dailyHistorical Med      OLANZapine (ZYPREXA) 15 MG tablet Take 15 mg by mouth nightlyHistorical Med      mirtazapine (REMERON) 15 MG tablet Take 15 mg by mouth nightlyHistorical Med      guaiFENesin 400 MG tablet Take 400 mg by mouth 4 times daily as needed for CoughHistorical Med      cetirizine (ZYRTEC) 10 MG tablet Take 10 mg by mouth dailyHistorical Med      tiotropium (SPIRIVA RESPIMAT) 2.5 MCG/ACT AERS inhaler Inhale 2 puffs into the lungs dailyHistorical Med      ibuprofen (ADVIL;MOTRIN) 800 MG tablet Take 800 mg by mouth every 8 hours as needed for PainHistorical Med      loratadine (CLARITIN) 10 MG capsule Take 10 mg by mouth daily as neededHistorical Med      pantoprazole (PROTONIX) 40 MG tablet Take 40 mg by mouth 2 times dailyHistorical Med      clonazePAM (KLONOPIN) 0.5 MG tablet Take 0.5 mg by mouth nightly. Historical Med      valACYclovir (VALTREX) 1 g tablet Take 2,000 mg by mouth 2 times dailyHistorical Med      colestipol (COLESTID) 1 g tablet Take 1 g by mouth 2 times dailyHistorical Med      FLUoxetine (PROZAC) 20 MG capsule Take 20 mg by mouth (Senokot-S), or Miralax. [x]  You may take Ibuprofen (over the counter) as directed for mild pain. --You may take up to 800mg every 8 hours for pain, please take with food or milk. []  Do not take any other acetaminophen (Tylenol) products if you are taking Percocet or Norco, as these contain Tylenol. --Do NOT take more than 4000mg of Tylenol in 24h. OPIOID MEDICATION INSTRUCTIONS  Read the medication guide that is included with your prescription. Take your medication exactly as prescribed. Store medication away from children and in a safe place. Do NOT share your medication with others. Do NOT take medication unless it is prescribed for you. Do NOT drink alcohol while taking opioids (I.e., Norco, Percocet, Oxycodone, etc). Discuss with the Trauma Clinic staff if the dose of medication you are taking does not control your pain and any side effects that you may be having. CALL 911 OR YOUR LOCAL EMERGENCY SERVICE:  --If you take too much medication  --If you have trouble breathing or shortness of breath  --A child has taken this medication. WORK:  You may not return to work until you receive follow-up with the Trauma Clinic or clearance by all consultants. Call the trauma clinic for any of the following or for questions/concerns;  --fever over 101F  --redness, swelling, hardness or warmth at the wound site(s). --Unrelieved nausea/vomiting  --Foul smelling or cloudy drainage at the wound site(s)  --Unrelieved pain or increase in pain  --Increase in shortness of breath    Follow-up:  Trauma Clinic: [phone] [address]  [place], [address] [place]    Discharge Home.    Call office to schedule a follow-up appointment at [phone]  Please schedule an appointment to be seen on Follow-up with Dr. Isabelle Hart in 1 week from surgery  Diet: regular diet  Activity: no lifting, Driving, or Strenuous exercise for 3 weeks  Shower/Bathing: [place] [address]  [phone]    In 1 week         Signed:  Johnson Gagnon DO  Electronically signed by Anna Mathew DO on 6/26/2020 at 4:37 AM

## 2020-06-21 NOTE — CONSULTS
[address]                  [address] [facility],  [address]                                  CONSULTATION    PATIENT NAME: Sravanthi Butcher                   :        1965  MED REC NO:   [id number]                            ROOM:       4516  ACCOUNT NO:   [de-identified]                           ADMIT DATE: 2020  PROVIDER:     Cathryn Roman MD    CONSULT DATE:  2020    REASON FOR CONSULTATION:  Acute T9 compression fracture. HISTORY OF PRESENT ILLNESS:  This is a 79-year-old female who reportedly  fell down a flight of steps. She is amnestic for the event. She had  significant facial trauma with bilateral raccoon eyes. She does not  feel she had loss of conscious, is somewhat of a poor historian. Does  have some tenderness over her back. Her admitting H and P was reviewed. Films were reviewed. MRI documents that it is an acute T9 fracture. PHYSICAL EXAMINATION:  NEUROLOGIC:  No motor or sensory deficits. Pupils equal, round,  reactive. Bilateral raccoon eyes. NECK:  Supple and some tenderness over thoracic spine. DIAGNOSIS:  Acute T9 fracture. Long discussion with her. Counseled  extensively over her options. She wants to try TLSO brace prior to  considering either a custom-fit brace or a kyphoplasty. We will follow  with you. PT/OT evaluations. No neurosurgical intervention planned at  this time.         Kristie Miller MD    D: 2020 12:50:40       T: 2020 12:53:09     [email or web address]  Job#: [id number]     Doc#: [id number]    CC:

## 2020-06-22 PROBLEM — S22.009A THORACIC SPINE FRACTURE (HCC): Status: ACTIVE | Noted: 2020-06-22

## 2020-06-22 LAB
ANION GAP SERPL CALCULATED.3IONS-SCNC: 11 MMOL/L (ref 7–16)
BASOPHILS ABSOLUTE: 0.04 E9/L (ref 0–0.2)
BASOPHILS RELATIVE PERCENT: 0.8 % (ref 0–2)
BUN BLDV-MCNC: 3 MG/DL (ref 6–20)
CALCIUM SERPL-MCNC: 8.8 MG/DL (ref 8.6–10.2)
CHLORIDE BLD-SCNC: 96 MMOL/L (ref 98–107)
CO2: 26 MMOL/L (ref 22–29)
CREAT SERPL-MCNC: 0.4 MG/DL (ref 0.5–1)
EOSINOPHILS ABSOLUTE: 0.14 E9/L (ref 0.05–0.5)
EOSINOPHILS RELATIVE PERCENT: 2.7 % (ref 0–6)
GFR AFRICAN AMERICAN: >60
GFR NON-AFRICAN AMERICAN: >60 ML/MIN/1.73
GLUCOSE BLD-MCNC: 95 MG/DL (ref 74–99)
HCT VFR BLD CALC: 29.8 % (ref 34–48)
HEMOGLOBIN: 9.6 G/DL (ref 11.5–15.5)
IMMATURE GRANULOCYTES #: 0.02 E9/L
IMMATURE GRANULOCYTES %: 0.4 % (ref 0–5)
LYMPHOCYTES ABSOLUTE: 1.63 E9/L (ref 1.5–4)
LYMPHOCYTES RELATIVE PERCENT: 31.7 % (ref 20–42)
MCH RBC QN AUTO: 28.5 PG (ref 26–35)
MCHC RBC AUTO-ENTMCNC: 32.2 % (ref 32–34.5)
MCV RBC AUTO: 88.4 FL (ref 80–99.9)
MONOCYTES ABSOLUTE: 0.52 E9/L (ref 0.1–0.95)
MONOCYTES RELATIVE PERCENT: 10.1 % (ref 2–12)
NEUTROPHILS ABSOLUTE: 2.79 E9/L (ref 1.8–7.3)
NEUTROPHILS RELATIVE PERCENT: 54.3 % (ref 43–80)
PDW BLD-RTO: 14.7 FL (ref 11.5–15)
PLATELET # BLD: 322 E9/L (ref 130–450)
PMV BLD AUTO: 10 FL (ref 7–12)
POTASSIUM REFLEX MAGNESIUM: 3.8 MMOL/L (ref 3.5–5)
RBC # BLD: 3.37 E12/L (ref 3.5–5.5)
SODIUM BLD-SCNC: 133 MMOL/L (ref 132–146)
WBC # BLD: 5.1 E9/L (ref 4.5–11.5)

## 2020-06-22 PROCEDURE — 36415 COLL VENOUS BLD VENIPUNCTURE: CPT

## 2020-06-22 PROCEDURE — 97530 THERAPEUTIC ACTIVITIES: CPT

## 2020-06-22 PROCEDURE — 2700000000 HC OXYGEN THERAPY PER DAY

## 2020-06-22 PROCEDURE — 2580000003 HC RX 258: Performed by: STUDENT IN AN ORGANIZED HEALTH CARE EDUCATION/TRAINING PROGRAM

## 2020-06-22 PROCEDURE — 2580000003 HC RX 258: Performed by: SURGERY

## 2020-06-22 PROCEDURE — 6360000002 HC RX W HCPCS: Performed by: SURGERY

## 2020-06-22 PROCEDURE — 6370000000 HC RX 637 (ALT 250 FOR IP): Performed by: SURGERY

## 2020-06-22 PROCEDURE — 97166 OT EVAL MOD COMPLEX 45 MIN: CPT

## 2020-06-22 PROCEDURE — 6370000000 HC RX 637 (ALT 250 FOR IP): Performed by: STUDENT IN AN ORGANIZED HEALTH CARE EDUCATION/TRAINING PROGRAM

## 2020-06-22 PROCEDURE — 2060000000 HC ICU INTERMEDIATE R&B

## 2020-06-22 PROCEDURE — 97535 SELF CARE MNGMENT TRAINING: CPT

## 2020-06-22 PROCEDURE — 85025 COMPLETE CBC W/AUTO DIFF WBC: CPT

## 2020-06-22 PROCEDURE — 2580000003 HC RX 258: Performed by: EMERGENCY MEDICINE

## 2020-06-22 PROCEDURE — 97162 PT EVAL MOD COMPLEX 30 MIN: CPT

## 2020-06-22 PROCEDURE — L0464 TLSO 4MOD SACRO-SCAP PRE: HCPCS

## 2020-06-22 PROCEDURE — 94640 AIRWAY INHALATION TREATMENT: CPT

## 2020-06-22 PROCEDURE — 99232 SBSQ HOSP IP/OBS MODERATE 35: CPT | Performed by: SURGERY

## 2020-06-22 PROCEDURE — 80048 BASIC METABOLIC PNL TOTAL CA: CPT

## 2020-06-22 RX ORDER — POTASSIUM CHLORIDE 20 MEQ/1
20 TABLET, EXTENDED RELEASE ORAL ONCE
Status: COMPLETED | OUTPATIENT
Start: 2020-06-22 | End: 2020-06-22

## 2020-06-22 RX ADMIN — CEPHALEXIN 500 MG: 500 CAPSULE ORAL at 06:09

## 2020-06-22 RX ADMIN — SODIUM CHLORIDE, PRESERVATIVE FREE 10 ML: 5 INJECTION INTRAVENOUS at 09:38

## 2020-06-22 RX ADMIN — METHOCARBAMOL TABLETS 1000 MG: 500 TABLET, COATED ORAL at 09:39

## 2020-06-22 RX ADMIN — BACITRACIN ZINC: 500 OINTMENT TOPICAL at 09:40

## 2020-06-22 RX ADMIN — OLANZAPINE 15 MG: 5 TABLET, FILM COATED ORAL at 21:30

## 2020-06-22 RX ADMIN — METHOCARBAMOL TABLETS 1000 MG: 500 TABLET, COATED ORAL at 21:30

## 2020-06-22 RX ADMIN — CEPHALEXIN 500 MG: 500 CAPSULE ORAL at 12:35

## 2020-06-22 RX ADMIN — ENOXAPARIN SODIUM 30 MG: 30 INJECTION SUBCUTANEOUS at 14:31

## 2020-06-22 RX ADMIN — ACYCLOVIR 800 MG: 800 TABLET ORAL at 14:32

## 2020-06-22 RX ADMIN — DIVALPROEX SODIUM 500 MG: 250 TABLET, DELAYED RELEASE ORAL at 21:30

## 2020-06-22 RX ADMIN — IPRATROPIUM BROMIDE 0.5 MG: 0.5 SOLUTION RESPIRATORY (INHALATION) at 20:42

## 2020-06-22 RX ADMIN — SODIUM CHLORIDE, PRESERVATIVE FREE 10 ML: 5 INJECTION INTRAVENOUS at 09:41

## 2020-06-22 RX ADMIN — ACETAMINOPHEN 650 MG: 325 TABLET ORAL at 06:09

## 2020-06-22 RX ADMIN — MIRTAZAPINE 15 MG: 15 TABLET, FILM COATED ORAL at 21:31

## 2020-06-22 RX ADMIN — CETIRIZINE HYDROCHLORIDE 10 MG: 10 TABLET, FILM COATED ORAL at 09:39

## 2020-06-22 RX ADMIN — CHOLESTYRAMINE 4 G: 4 POWDER, FOR SUSPENSION ORAL at 09:40

## 2020-06-22 RX ADMIN — IPRATROPIUM BROMIDE 0.5 MG: 0.5 SOLUTION RESPIRATORY (INHALATION) at 16:07

## 2020-06-22 RX ADMIN — CEPHALEXIN 500 MG: 500 CAPSULE ORAL at 17:12

## 2020-06-22 RX ADMIN — PANTOPRAZOLE SODIUM 40 MG: 40 TABLET, DELAYED RELEASE ORAL at 21:43

## 2020-06-22 RX ADMIN — IPRATROPIUM BROMIDE 0.5 MG: 0.5 SOLUTION RESPIRATORY (INHALATION) at 11:41

## 2020-06-22 RX ADMIN — LAMOTRIGINE 200 MG: 100 TABLET ORAL at 21:45

## 2020-06-22 RX ADMIN — OXYCODONE HYDROCHLORIDE 10 MG: 10 TABLET ORAL at 15:40

## 2020-06-22 RX ADMIN — ACETAMINOPHEN 650 MG: 325 TABLET ORAL at 17:12

## 2020-06-22 RX ADMIN — OXYCODONE HYDROCHLORIDE 10 MG: 10 TABLET ORAL at 05:48

## 2020-06-22 RX ADMIN — SODIUM CHLORIDE, PRESERVATIVE FREE 10 ML: 5 INJECTION INTRAVENOUS at 21:42

## 2020-06-22 RX ADMIN — METHOCARBAMOL TABLETS 1000 MG: 500 TABLET, COATED ORAL at 17:12

## 2020-06-22 RX ADMIN — SODIUM CHLORIDE, POTASSIUM CHLORIDE, SODIUM LACTATE AND CALCIUM CHLORIDE: 600; 310; 30; 20 INJECTION, SOLUTION INTRAVENOUS at 02:23

## 2020-06-22 RX ADMIN — BACITRACIN ZINC: 500 OINTMENT TOPICAL at 14:32

## 2020-06-22 RX ADMIN — BACITRACIN ZINC: 500 OINTMENT TOPICAL at 21:30

## 2020-06-22 RX ADMIN — ACYCLOVIR 800 MG: 800 TABLET ORAL at 21:34

## 2020-06-22 RX ADMIN — ACYCLOVIR 800 MG: 800 TABLET ORAL at 11:21

## 2020-06-22 RX ADMIN — CLONAZEPAM 0.5 MG: 0.5 TABLET ORAL at 21:31

## 2020-06-22 RX ADMIN — LAMOTRIGINE 200 MG: 100 TABLET ORAL at 09:39

## 2020-06-22 RX ADMIN — OXYCODONE HYDROCHLORIDE 10 MG: 10 TABLET ORAL at 20:17

## 2020-06-22 RX ADMIN — METHOCARBAMOL TABLETS 1000 MG: 500 TABLET, COATED ORAL at 12:35

## 2020-06-22 RX ADMIN — OXYCODONE HYDROCHLORIDE 10 MG: 10 TABLET ORAL at 09:52

## 2020-06-22 RX ADMIN — DOCUSATE SODIUM 50MG AND SENNOSIDES 8.6MG 1 TABLET: 8.6; 5 TABLET, FILM COATED ORAL at 21:32

## 2020-06-22 RX ADMIN — ENOXAPARIN SODIUM 30 MG: 30 INJECTION SUBCUTANEOUS at 21:42

## 2020-06-22 RX ADMIN — ACETAMINOPHEN 650 MG: 325 TABLET ORAL at 12:36

## 2020-06-22 RX ADMIN — IPRATROPIUM BROMIDE 0.5 MG: 0.5 SOLUTION RESPIRATORY (INHALATION) at 08:08

## 2020-06-22 RX ADMIN — DOCUSATE SODIUM 50MG AND SENNOSIDES 8.6MG 1 TABLET: 8.6; 5 TABLET, FILM COATED ORAL at 09:40

## 2020-06-22 RX ADMIN — ACYCLOVIR 800 MG: 800 TABLET ORAL at 06:09

## 2020-06-22 RX ADMIN — PANTOPRAZOLE SODIUM 40 MG: 40 TABLET, DELAYED RELEASE ORAL at 09:40

## 2020-06-22 RX ADMIN — POTASSIUM CHLORIDE 20 MEQ: 1500 TABLET, EXTENDED RELEASE ORAL at 14:32

## 2020-06-22 RX ADMIN — FLUOXETINE HYDROCHLORIDE 20 MG: 20 CAPSULE ORAL at 09:39

## 2020-06-22 RX ADMIN — DIVALPROEX SODIUM 500 MG: 250 TABLET, DELAYED RELEASE ORAL at 09:39

## 2020-06-22 ASSESSMENT — PAIN SCALES - GENERAL
PAINLEVEL_OUTOF10: 8
PAINLEVEL_OUTOF10: 10

## 2020-06-22 ASSESSMENT — PAIN DESCRIPTION - DESCRIPTORS: DESCRIPTORS: ACHING

## 2020-06-22 ASSESSMENT — PAIN DESCRIPTION - LOCATION: LOCATION: FACE;EYE;HEAD

## 2020-06-22 ASSESSMENT — PAIN DESCRIPTION - FREQUENCY: FREQUENCY: CONTINUOUS

## 2020-06-22 ASSESSMENT — PAIN DESCRIPTION - ONSET: ONSET: ON-GOING

## 2020-06-22 ASSESSMENT — PAIN DESCRIPTION - PROGRESSION: CLINICAL_PROGRESSION: NOT CHANGED

## 2020-06-22 ASSESSMENT — PAIN DESCRIPTION - PAIN TYPE: TYPE: ACUTE PAIN

## 2020-06-22 NOTE — PROGRESS NOTES
Poor  Fair to complete 20 min of self care tasks with Min A    Visual/  Perceptual Glasses: no           BUE  ROM/Strength/  Fine motor Coordination RUE: ROM WFL 0-90* shld flex, WFL distal      Strength: grossly 4-/5      Strength:  WFL     Coordination: WFL    LUE: ROM  WFL 0-90* shld flex, WFL distal      Strength: grossly 4/5      Strength:  WFL     Coordination: WFL         Hearing: WFL  Sensation:  No c/o numbness or tingling  Tone:  WFL  Edema: None noted                          Treatment: OT treatment provided this date includes: Pt was Dep for socks. Pt Dep for donning and doffing TLSO with instruction on proper rolling technique to follow spinal precautions. Discussion on possible need for assistive devices or assistance needed from others at home for dressing strategies. Pt c/o being dizzy with movement. Prefers to keep her eyes closed when doing mobility. Comments: Upon arrival, patient supine in bed . At end of session, patient supine in bed with call light and phone within reach, all lines and tubes intact. Pt demonstrating fair understanding of education/techniques. Patient would benefit from continued skilled OT  to improve safety, functional independence and quality of life.     Assessment of current deficits   Functional mobility [x]  ADLs [x] Strength [x]  Cognition []  Functional transfers  [x] IADLs [] Safety Awareness [x]  Endurance [x]  Fine Motor Coordination [] Balance [x] Vision/perception [] Sensation []   Gross Motor Coordination [] ROM [] Delirium []                  Motor Control []    Plan of Care:   ADL retraining [x]    Equipment needs [x]   Neuromuscular re-education [x]  Energy Conservation Techniques [x]  Functional Transfer training [x]  Patient and/or Family Education [x]  Functional Mobility training [x]              Environmental Modifications [x]  Cognitive re-training []    Compensatory techniques for ADLs [x]  Splinting Needs []    Positioning to improve overall function [x]   Therapeutic Activity [x]               Therapeutic Exercise  [x]  Visual/Perceptual: []     Delirium prevention/treatment  []   Other:  []    Rehab Potential: Good for established goals    Patient / Family Goal: To return home      Evaluation time includes thorough review of current medical information, gathering information on past medical & social history & PLOF, completion of standardized testing, informal observation of tasks, consultation with other medical professions/disciplines, assessment of data & development of POC/goals.      Evaluation Complexity: Mod     Treatment Time In: 10:30             Treatment Time Out:  10:53             Treatment Charges: Mins Units   Ther Ex  13599     Manual Therapy 39985     Thera Activities 45262 15 1   ADL/Home Mgt 99735 8 1   Neuro Re-ed 13936     Group Therapy      Orthotic manage/training  37685     Non-Billable Time     Total Timed Treatment 25 2        Patton Dura, OTR/L [id number]

## 2020-06-22 NOTE — PROGRESS NOTES
TRAUMA SURGERY  ATTENDING PROGRESS NOTE      CC: fall down steps     S:   c/o pain in back and face     O:   @BP (!) 158/74   Pulse 80   Temp 97.6 °F (36.4 °C) (Oral)   Resp 16   Ht 5' 5\" (1.651 m)   Wt 146 lb (66.2 kg)   SpO2 94%   BMI 24.30 kg/m² @    Gen - no apparent distress   Neuro - Awake, alert, attentive    HEENT - PERRL 3mm, b/l periorbital ecchymosis,   Lungs - non labored, BS clear    Heart - RR   Abdomen - snt   Spine -   Moderate t spine tenderness   Ext- nvi     A/P: s/p fall with facial fx, and T spine fx,       Active Problems:    Trauma  Resolved Problems:    * No resolved hospital problems.  *      - Face fx, non op, keflex x 5 days   - T spine fx, TLSO NS following, non op   - multimodal pain control   - home meds, seizure meds, restarted,   - PTOT 13/24, placement       DVT prophylaxis: SCDs, Lovenox        Jayshree Clark MD FACS

## 2020-06-22 NOTE — PROGRESS NOTES
None    Result Date: 2020  Patient MRN:  [id number] : 1965 Age: 50 years Gender: Female Order Date:  2020 10:45 AM EXAM: CT ABDOMEN PELVIS W IV CONTRAST NUMBER OF IMAGES:  302 INDICATION:  trauma, fall trauma, fall COMPARISON: None TECHNIQUE: CT of the abdomen and pelvis with contrast was performed using standard technique, scanning from just above the dome of the diaphragms to the symphysis pubis. Contrast: IV:  110 ml of Isovue-370 CT Radiation dose: Integrated Dose-length product (DLP) for this visit =  692 mGy*cm. CT Dose Reduction Employed: Yes RESULT: Liver: No mass. Biliary: No bile duct dilation. Gallbladder is absent. Spleen: No mass. No splenomegaly. Pancreas: No mass or duct dilation. Adrenals: No mass. Kidneys: No enhancing renal mass. No hydronephrosis. No laceration. GI tract: No dilation or wall thickening. Lymph nodes: Scattered enlarged mesenteric lymph nodes. Mesentery/Peritoneum: No ascites or mass. Vasculature: The celiac axis and SMA are patent. The portal vein and branches, splenic vein, SMV, and hepatic veins are patent. No abdominal aortic or iliac artery aneurysm. Pelvis: IUD in place. No ascites. Mason catheter within the urinary bladder. Bones/Soft Tissues: Irregularity in the right inferior pubic ramus/ischium is related to remote injury. Soft tissue bruise within the soft tissues overlying the right greater trochanter. Small fat-containing anterior abdominal wall hernia. Lower thorax: This will be reported separately. No CT evidence of acute traumatic abnormality in the abdomen or pelvis. Soft tissue bruise overlying the right greater trochanter. Scattered enlarged mesenteric lymph nodes. Small fat-containing anterior abdominal wall hernia.      Xr Chest Portable    Result Date: 2020  Patient MRN:  [id number] : 1965 Age: 50 years Gender: Female Order Date:  2020 9:45 AM EXAM: XR CHEST PORTABLE NUMBER OF IMAGES:    1 INDICATION:  Trauma, hypoxia

## 2020-06-22 NOTE — PROGRESS NOTES
Physical Therapy Initial Assessment     Name: Cathy Leon  : 1965  MRN: [id number]    Referring Provider:  Day Wasserman MD    Date of Service: 2020    Evaluating PT:  Levi Jaylin PT, DPT    Room #:  6347/2310-S  Diagnosis:  Trauma  PMHx/PSHx:  Crohn's colitis, COPD, Bipolar 1 disorder  Procedure/Surgery:  T9 compression fracture, Multiple facial fractures, B black eyes, forehead/nose abrasions, L 3,4,5,6 rib fractures  Precautions:  Falls, TLSO when OOB, Spine, Log roll  Equipment Needs:  TBD    SUBJECTIVE:    Pt lives with her son in a 2 story home with 2 stairs to enter and 1 rail(s). Bed is on 1st floor and bath is on 1st floor. Basement laundry with flight and 1 rail(s). Pt ambulated with no AD PTA. Pt reported independent with ADLs. Pt is not actively driving. Pt reported having multiple recent falls \"while sleeping\". OBJECTIVE:   Initial Evaluation  Date: 2020 Treatment  NA Short Term/ Long Term   Goals   AM-PAC 6 Clicks      Was pt agreeable to Eval/treatment? Yes      Does pt have pain? Reported 10/10 HA pain and 10/10 back pain with mobility. Pain  Medication administered prior to assessment. Bed Mobility  Rolling: Min A  Supine to sit: Mod A  Sit to supine: Mod A  Scooting: Min A  SBA   Transfers Sit to stand: Min A  Stand to sit: Min A  Stand pivot: NT  SBA   Ambulation    3 feet fwd/bkwd/laterally with no AD with Min A  >150 feet with AAD if needed with SBA   Stair negotiation: ascended and descended  NT  10 steps with 1 rail with SBA   ROM BUE:  WFL  BLE:  WFL     Strength BUE:  Per OT  BLE:  4/5  Increase 1/3 grade   Balance Sitting EOB:  SBA  Dynamic Standing:  Min A  Sitting EOB:  Supervision  Dynamic Standing:  SBA     Pt is A & O x 3  Sensation:  Pt denied numbness and tingling to extremities  Edema:   Moderate facial edema, R eye swollen shut    Therapeutic Exercises:  NT    Patient education  Pt educated on purpose of PT assessment, importance of

## 2020-06-23 ENCOUNTER — TELEPHONE (OUTPATIENT)
Dept: SURGERY | Age: 55
End: 2020-06-23

## 2020-06-23 ENCOUNTER — APPOINTMENT (OUTPATIENT)
Dept: GENERAL RADIOLOGY | Age: 55
DRG: 115 | End: 2020-06-23
Payer: MEDICAID

## 2020-06-23 LAB
ANION GAP SERPL CALCULATED.3IONS-SCNC: 11 MMOL/L (ref 7–16)
ANISOCYTOSIS: ABNORMAL
BASOPHILS ABSOLUTE: 0.1 E9/L (ref 0–0.2)
BASOPHILS RELATIVE PERCENT: 1.8 % (ref 0–2)
BUN BLDV-MCNC: 4 MG/DL (ref 6–20)
BURR CELLS: ABNORMAL
CALCIUM SERPL-MCNC: 8.9 MG/DL (ref 8.6–10.2)
CHLORIDE BLD-SCNC: 96 MMOL/L (ref 98–107)
CO2: 26 MMOL/L (ref 22–29)
CREAT SERPL-MCNC: 0.5 MG/DL (ref 0.5–1)
EOSINOPHILS ABSOLUTE: 0.28 E9/L (ref 0.05–0.5)
EOSINOPHILS RELATIVE PERCENT: 5.3 % (ref 0–6)
GFR AFRICAN AMERICAN: >60
GFR NON-AFRICAN AMERICAN: >60 ML/MIN/1.73
GLUCOSE BLD-MCNC: 87 MG/DL (ref 74–99)
HCT VFR BLD CALC: 28.1 % (ref 34–48)
HEMOGLOBIN: 9.1 G/DL (ref 11.5–15.5)
LYMPHOCYTES ABSOLUTE: 2.07 E9/L (ref 1.5–4)
LYMPHOCYTES RELATIVE PERCENT: 38.6 % (ref 20–42)
MCH RBC QN AUTO: 27.7 PG (ref 26–35)
MCHC RBC AUTO-ENTMCNC: 32.4 % (ref 32–34.5)
MCV RBC AUTO: 85.4 FL (ref 80–99.9)
METAMYELOCYTES RELATIVE PERCENT: 0.9 % (ref 0–1)
MONOCYTES ABSOLUTE: 0.53 E9/L (ref 0.1–0.95)
MONOCYTES RELATIVE PERCENT: 9.6 % (ref 2–12)
MYELOCYTE PERCENT: 0.9 % (ref 0–0)
NEUTROPHILS ABSOLUTE: 2.39 E9/L (ref 1.8–7.3)
NEUTROPHILS RELATIVE PERCENT: 43 % (ref 43–80)
OVALOCYTES: ABNORMAL
PDW BLD-RTO: 14.6 FL (ref 11.5–15)
PLATELET # BLD: 463 E9/L (ref 130–450)
PMV BLD AUTO: 8.1 FL (ref 7–12)
POIKILOCYTES: ABNORMAL
POTASSIUM REFLEX MAGNESIUM: 4.7 MMOL/L (ref 3.5–5)
RBC # BLD: 3.29 E12/L (ref 3.5–5.5)
SODIUM BLD-SCNC: 133 MMOL/L (ref 132–146)
WBC # BLD: 5.3 E9/L (ref 4.5–11.5)

## 2020-06-23 PROCEDURE — 94761 N-INVAS EAR/PLS OXIMETRY MLT: CPT

## 2020-06-23 PROCEDURE — 2700000000 HC OXYGEN THERAPY PER DAY

## 2020-06-23 PROCEDURE — 94640 AIRWAY INHALATION TREATMENT: CPT

## 2020-06-23 PROCEDURE — 97530 THERAPEUTIC ACTIVITIES: CPT

## 2020-06-23 PROCEDURE — 6360000002 HC RX W HCPCS: Performed by: SURGERY

## 2020-06-23 PROCEDURE — 99232 SBSQ HOSP IP/OBS MODERATE 35: CPT | Performed by: SURGERY

## 2020-06-23 PROCEDURE — 6370000000 HC RX 637 (ALT 250 FOR IP): Performed by: STUDENT IN AN ORGANIZED HEALTH CARE EDUCATION/TRAINING PROGRAM

## 2020-06-23 PROCEDURE — 2580000003 HC RX 258: Performed by: STUDENT IN AN ORGANIZED HEALTH CARE EDUCATION/TRAINING PROGRAM

## 2020-06-23 PROCEDURE — 85025 COMPLETE CBC W/AUTO DIFF WBC: CPT

## 2020-06-23 PROCEDURE — 73110 X-RAY EXAM OF WRIST: CPT

## 2020-06-23 PROCEDURE — 36415 COLL VENOUS BLD VENIPUNCTURE: CPT

## 2020-06-23 PROCEDURE — 97535 SELF CARE MNGMENT TRAINING: CPT

## 2020-06-23 PROCEDURE — 94760 N-INVAS EAR/PLS OXIMETRY 1: CPT

## 2020-06-23 PROCEDURE — 2060000000 HC ICU INTERMEDIATE R&B

## 2020-06-23 PROCEDURE — 6370000000 HC RX 637 (ALT 250 FOR IP): Performed by: SURGERY

## 2020-06-23 PROCEDURE — 80048 BASIC METABOLIC PNL TOTAL CA: CPT

## 2020-06-23 RX ORDER — LISINOPRIL 5 MG/1
5 TABLET ORAL DAILY
Status: DISCONTINUED | OUTPATIENT
Start: 2020-06-24 | End: 2020-06-25 | Stop reason: HOSPADM

## 2020-06-23 RX ORDER — LABETALOL HYDROCHLORIDE 5 MG/ML
10 INJECTION, SOLUTION INTRAVENOUS
Status: DISCONTINUED | OUTPATIENT
Start: 2020-06-23 | End: 2020-06-24

## 2020-06-23 RX ORDER — HYDRALAZINE HYDROCHLORIDE 20 MG/ML
10 INJECTION INTRAMUSCULAR; INTRAVENOUS
Status: DISCONTINUED | OUTPATIENT
Start: 2020-06-23 | End: 2020-06-24

## 2020-06-23 RX ADMIN — ACETAMINOPHEN 650 MG: 325 TABLET ORAL at 11:47

## 2020-06-23 RX ADMIN — ACYCLOVIR 800 MG: 800 TABLET ORAL at 21:44

## 2020-06-23 RX ADMIN — OLANZAPINE 15 MG: 5 TABLET, FILM COATED ORAL at 21:44

## 2020-06-23 RX ADMIN — DIVALPROEX SODIUM 500 MG: 250 TABLET, DELAYED RELEASE ORAL at 09:31

## 2020-06-23 RX ADMIN — METHOCARBAMOL TABLETS 1000 MG: 500 TABLET, COATED ORAL at 16:50

## 2020-06-23 RX ADMIN — OXYCODONE 5 MG: 5 TABLET ORAL at 06:26

## 2020-06-23 RX ADMIN — CEPHALEXIN 500 MG: 500 CAPSULE ORAL at 17:47

## 2020-06-23 RX ADMIN — BACITRACIN ZINC: 500 OINTMENT TOPICAL at 21:46

## 2020-06-23 RX ADMIN — OXYCODONE HYDROCHLORIDE 10 MG: 10 TABLET ORAL at 21:44

## 2020-06-23 RX ADMIN — CETIRIZINE HYDROCHLORIDE 10 MG: 10 TABLET, FILM COATED ORAL at 09:31

## 2020-06-23 RX ADMIN — ACYCLOVIR 800 MG: 800 TABLET ORAL at 16:50

## 2020-06-23 RX ADMIN — CHOLESTYRAMINE 4 G: 4 POWDER, FOR SUSPENSION ORAL at 09:31

## 2020-06-23 RX ADMIN — DIVALPROEX SODIUM 500 MG: 250 TABLET, DELAYED RELEASE ORAL at 21:44

## 2020-06-23 RX ADMIN — ACETAMINOPHEN 650 MG: 325 TABLET ORAL at 01:26

## 2020-06-23 RX ADMIN — DOCUSATE SODIUM 50MG AND SENNOSIDES 8.6MG 1 TABLET: 8.6; 5 TABLET, FILM COATED ORAL at 21:44

## 2020-06-23 RX ADMIN — ENOXAPARIN SODIUM 30 MG: 30 INJECTION SUBCUTANEOUS at 09:33

## 2020-06-23 RX ADMIN — OXYCODONE 5 MG: 5 TABLET ORAL at 11:47

## 2020-06-23 RX ADMIN — LAMOTRIGINE 200 MG: 100 TABLET ORAL at 21:43

## 2020-06-23 RX ADMIN — METHOCARBAMOL TABLETS 1000 MG: 500 TABLET, COATED ORAL at 13:35

## 2020-06-23 RX ADMIN — BACITRACIN ZINC: 500 OINTMENT TOPICAL at 09:33

## 2020-06-23 RX ADMIN — IPRATROPIUM BROMIDE 0.5 MG: 0.5 SOLUTION RESPIRATORY (INHALATION) at 16:25

## 2020-06-23 RX ADMIN — OXYCODONE HYDROCHLORIDE 10 MG: 10 TABLET ORAL at 16:54

## 2020-06-23 RX ADMIN — ACETAMINOPHEN 650 MG: 325 TABLET ORAL at 17:47

## 2020-06-23 RX ADMIN — CLONAZEPAM 0.5 MG: 0.5 TABLET ORAL at 21:43

## 2020-06-23 RX ADMIN — CEPHALEXIN 500 MG: 500 CAPSULE ORAL at 11:47

## 2020-06-23 RX ADMIN — PANTOPRAZOLE SODIUM 40 MG: 40 TABLET, DELAYED RELEASE ORAL at 21:44

## 2020-06-23 RX ADMIN — FLUOXETINE HYDROCHLORIDE 20 MG: 20 CAPSULE ORAL at 09:32

## 2020-06-23 RX ADMIN — SODIUM CHLORIDE, PRESERVATIVE FREE 10 ML: 5 INJECTION INTRAVENOUS at 09:32

## 2020-06-23 RX ADMIN — IPRATROPIUM BROMIDE 0.5 MG: 0.5 SOLUTION RESPIRATORY (INHALATION) at 12:41

## 2020-06-23 RX ADMIN — IPRATROPIUM BROMIDE 0.5 MG: 0.5 SOLUTION RESPIRATORY (INHALATION) at 10:01

## 2020-06-23 RX ADMIN — ENOXAPARIN SODIUM 30 MG: 30 INJECTION SUBCUTANEOUS at 21:44

## 2020-06-23 RX ADMIN — LAMOTRIGINE 200 MG: 100 TABLET ORAL at 09:31

## 2020-06-23 RX ADMIN — BACITRACIN ZINC: 500 OINTMENT TOPICAL at 13:35

## 2020-06-23 RX ADMIN — METHOCARBAMOL TABLETS 1000 MG: 500 TABLET, COATED ORAL at 21:44

## 2020-06-23 RX ADMIN — PANTOPRAZOLE SODIUM 40 MG: 40 TABLET, DELAYED RELEASE ORAL at 09:32

## 2020-06-23 RX ADMIN — CEPHALEXIN 500 MG: 500 CAPSULE ORAL at 01:26

## 2020-06-23 RX ADMIN — ACYCLOVIR 800 MG: 800 TABLET ORAL at 11:47

## 2020-06-23 RX ADMIN — MIRTAZAPINE 15 MG: 15 TABLET, FILM COATED ORAL at 21:43

## 2020-06-23 RX ADMIN — CEPHALEXIN 500 MG: 500 CAPSULE ORAL at 06:25

## 2020-06-23 RX ADMIN — IPRATROPIUM BROMIDE 0.5 MG: 0.5 SOLUTION RESPIRATORY (INHALATION) at 20:15

## 2020-06-23 RX ADMIN — ACYCLOVIR 800 MG: 800 TABLET ORAL at 06:25

## 2020-06-23 RX ADMIN — METHOCARBAMOL TABLETS 1000 MG: 500 TABLET, COATED ORAL at 09:32

## 2020-06-23 ASSESSMENT — PAIN DESCRIPTION - FREQUENCY
FREQUENCY: CONTINUOUS
FREQUENCY: CONTINUOUS

## 2020-06-23 ASSESSMENT — PAIN SCALES - GENERAL
PAINLEVEL_OUTOF10: 5
PAINLEVEL_OUTOF10: 9
PAINLEVEL_OUTOF10: 10
PAINLEVEL_OUTOF10: 0
PAINLEVEL_OUTOF10: 7
PAINLEVEL_OUTOF10: 5
PAINLEVEL_OUTOF10: 2
PAINLEVEL_OUTOF10: 2
PAINLEVEL_OUTOF10: 6
PAINLEVEL_OUTOF10: 0
PAINLEVEL_OUTOF10: 5

## 2020-06-23 ASSESSMENT — PAIN DESCRIPTION - ONSET
ONSET: ON-GOING
ONSET: ON-GOING

## 2020-06-23 ASSESSMENT — PAIN DESCRIPTION - ORIENTATION
ORIENTATION: RIGHT;LEFT
ORIENTATION: RIGHT;LEFT

## 2020-06-23 ASSESSMENT — PAIN DESCRIPTION - LOCATION: LOCATION: FACE;EYE;HEAD

## 2020-06-23 ASSESSMENT — PAIN DESCRIPTION - PAIN TYPE
TYPE: ACUTE PAIN
TYPE: ACUTE PAIN

## 2020-06-23 ASSESSMENT — PAIN DESCRIPTION - PROGRESSION
CLINICAL_PROGRESSION: NOT CHANGED
CLINICAL_PROGRESSION: NOT CHANGED

## 2020-06-23 ASSESSMENT — PAIN DESCRIPTION - DESCRIPTORS
DESCRIPTORS: ACHING
DESCRIPTORS: ACHING

## 2020-06-23 NOTE — PROGRESS NOTES
PORTABLE NUMBER OF IMAGES:    1 INDICATION:  Trauma, hypoxia Trauma, hypoxia COMPARISON: None TECHNIQUE: AP view of the chest. FINDINGS: Cardiomediastinal silhouette and pulmonary vasculature are normal. Bibasilar opacities noted. No pneumothorax seen. There are degenerative changes in the shoulders and spine. Bibasilar opacities. Differential includes atelectasis, infection, and/or layering pleural effusions.      CBC:   Lab Results   Component Value Date    WBC 5.3 06/23/2020    RBC 3.29 06/23/2020    HGB 9.1 06/23/2020    HCT 28.1 06/23/2020    MCV 85.4 06/23/2020    MCH 27.7 06/23/2020    MCHC 32.4 06/23/2020    RDW 14.6 06/23/2020     06/23/2020    MPV 8.1 06/23/2020     BMP:    Lab Results   Component Value Date     06/23/2020    K 4.7 06/23/2020    CL 96 06/23/2020    CO2 26 06/23/2020    BUN 4 06/23/2020    LABALBU 3.6 06/20/2020    CREATININE 0.5 06/23/2020    CALCIUM 8.9 06/23/2020    GFRAA >60 06/23/2020    LABGLOM >60 06/23/2020    GLUCOSE 87 06/23/2020      enoxaparin  30 mg Subcutaneous BID    cetirizine  10 mg Oral Daily    divalproex  500 mg Oral BID    clonazePAM  0.5 mg Oral Nightly    cholestyramine light  4 g Oral Daily    FLUoxetine  20 mg Oral Daily    lamoTRIgine  200 mg Oral BID    mirtazapine  15 mg Oral Nightly    OLANZapine  15 mg Oral Nightly    pantoprazole  40 mg Oral BID    acyclovir  800 mg Oral 5x Daily    ipratropium  500 mcg Nebulization 4x Daily    sodium chloride flush  10 mL Intravenous 2 times per day    sodium chloride flush  10 mL Intravenous 2 times per day    sodium chloride flush  10 mL Intravenous 2 times per day    acetaminophen  650 mg Oral 4 times per day    bacitracin zinc   Topical TID    methocarbamol  1,000 mg Oral 4x Daily    lidocaine  1 patch Transdermal Daily    sennosides-docusate sodium  1 tablet Oral BID    cephALEXin  500 mg Oral 4 times per day     Awake and alert, perrl eomi motor full  Assessment:  Patient Active

## 2020-06-23 NOTE — TELEPHONE ENCOUNTER
Surgery has been scheduled at [address] on 06/25/2020, Pre-Admission Testing will call you prior to surgery to inform you arrival time and any other additional directions,if they are unable to reach you,please call them two days prior at [phone]. If taking Fish Oil, Vitamins, two weeks prior to surgery stop taking. If taking NSAIDS (such as Aspirin, Ibuprofen) anticoagulants please consult with your prescribing physician to get further instructions on when to stop medication prior to surgery that is scheduled, patient understood.     Proc:  Closed reduction of nasal bones  Dx: Comminuated nasal bone fracture    Pre-Auth #:  CPT Codes:   ICD 10:

## 2020-06-23 NOTE — CARE COORDINATION
Plan at discharge remains Home with her daughter. Therapy in to see patient. She did better today, contact guard when ambulating. They will continue to work with her while she is here and recommend DME if needed. Her son or daughter will provide transport home. CM will follow for any needs that arise. Any questions I can be reached @ [phone].  Gale Bazan RN Case Manager

## 2020-06-23 NOTE — PROGRESS NOTES
exercises 21818 - minutes  [] Neuromuscular reeducation 92866 - minutes     Magnolia Rosales, PTA  [id number]

## 2020-06-23 NOTE — PROGRESS NOTES
Occupational Therapy  OT BEDSIDE TREATMENT NOTE      Date:2020  Patient Name: Oneil Tracy  MRN: [id number]  : 1965  Room: [address] [place], [place] [address]     Referring Provider: Jerson Arana MD     Evaluating OT: Nilam Xiong OTR/L [id number]     AM-PAC Daily Activity Raw Score:      Recommended Adaptive Equipment: To be further assessed       Diagnosis: Trauma , multiple facial fractures and T9 fracture     Pertinent Medical History: Bipolar, COPD, Crohns colitis    Precautions:  Falls, TLSO,      Home Living: Pt lives with her 16year old son  in a 2 story with 2 step(s) to enter and 1 rail(s); bed/bath on first floor   Bathroom setup: tub/ shower unit  Equipment owned: none  Prior Level of Function: Independent  with ADLs , Independent  with IADLs; using no AD for ambulation. Driving: no      Pain Level: 10/10 in head and back    Cognition: A&O: 3/3; Follows 1-2 step directions              Memory:  fair               Sequencing:  fair               Problem solving:  fair               Judgement/safety:  fair                 Functional Assessment:    Initial Eval Status  Date: 20 Treatment Status  Date: 20 STG/LTG  Frequency/duration  2-3x/week, 5-7 days   Feeding Supervision   Indep   Independent    Grooming Moderate Assist   Min A   Minimal Assist    UB Dressing Maximal Assist   For gown management   Dep for TLSO   Max A; To properly earnest TLSO. Minimal Assist    LB Dressing Dependent   Dep for socks   Min A;    Pt complete figure 4 technique to earnest/doff socks while in chair. Provided pt with a hip kit due to safety when returning home. Modified [place]    Bathing Maximal Assist N/T   Minimal Assist    Toileting Dependent   Pt using bedpan  SBA; Min verbal prompting for proper hand placement to properly transfer on/off commode. Minimal Assist    Bed Mobility  Supine to sit: Mod A   Sit to supine:  Moderate Assist  Supine to sit: N/T   Sit to supine: N/T;    Pt sitting up in the

## 2020-06-24 ENCOUNTER — ANESTHESIA EVENT (OUTPATIENT)
Dept: OPERATING ROOM | Age: 55
DRG: 115 | End: 2020-06-24
Payer: MEDICAID

## 2020-06-24 PROBLEM — S02.85XA ORBIT FRACTURE, LEFT (HCC): Status: ACTIVE | Noted: 2020-06-24

## 2020-06-24 PROBLEM — S22.42XA FRACTURE OF MULTIPLE RIBS OF LEFT SIDE: Status: ACTIVE | Noted: 2020-06-24

## 2020-06-24 PROBLEM — T14.90XA TRAUMA: Status: RESOLVED | Noted: 2020-06-20 | Resolved: 2020-06-24

## 2020-06-24 PROBLEM — S02.2XXA NASAL BONE FRACTURE: Status: ACTIVE | Noted: 2020-06-24

## 2020-06-24 LAB
ANION GAP SERPL CALCULATED.3IONS-SCNC: 11 MMOL/L (ref 7–16)
BASOPHILS ABSOLUTE: 0.04 E9/L (ref 0–0.2)
BASOPHILS RELATIVE PERCENT: 0.7 % (ref 0–2)
BUN BLDV-MCNC: 5 MG/DL (ref 6–20)
CALCIUM SERPL-MCNC: 9 MG/DL (ref 8.6–10.2)
CHLORIDE BLD-SCNC: 96 MMOL/L (ref 98–107)
CO2: 25 MMOL/L (ref 22–29)
CREAT SERPL-MCNC: 0.5 MG/DL (ref 0.5–1)
EOSINOPHILS ABSOLUTE: 0.21 E9/L (ref 0.05–0.5)
EOSINOPHILS RELATIVE PERCENT: 3.9 % (ref 0–6)
GFR AFRICAN AMERICAN: >60
GFR NON-AFRICAN AMERICAN: >60 ML/MIN/1.73
GLUCOSE BLD-MCNC: 87 MG/DL (ref 74–99)
HCT VFR BLD CALC: 29.1 % (ref 34–48)
HEMOGLOBIN: 9.2 G/DL (ref 11.5–15.5)
IMMATURE GRANULOCYTES #: 0.03 E9/L
IMMATURE GRANULOCYTES %: 0.6 % (ref 0–5)
LYMPHOCYTES ABSOLUTE: 2.54 E9/L (ref 1.5–4)
LYMPHOCYTES RELATIVE PERCENT: 47.3 % (ref 20–42)
MCH RBC QN AUTO: 27.2 PG (ref 26–35)
MCHC RBC AUTO-ENTMCNC: 31.6 % (ref 32–34.5)
MCV RBC AUTO: 86.1 FL (ref 80–99.9)
MONOCYTES ABSOLUTE: 0.61 E9/L (ref 0.1–0.95)
MONOCYTES RELATIVE PERCENT: 11.4 % (ref 2–12)
NEUTROPHILS ABSOLUTE: 1.94 E9/L (ref 1.8–7.3)
NEUTROPHILS RELATIVE PERCENT: 36.1 % (ref 43–80)
PDW BLD-RTO: 14.7 FL (ref 11.5–15)
PLATELET # BLD: 484 E9/L (ref 130–450)
PMV BLD AUTO: 8.3 FL (ref 7–12)
POTASSIUM REFLEX MAGNESIUM: 4.4 MMOL/L (ref 3.5–5)
RBC # BLD: 3.38 E12/L (ref 3.5–5.5)
SODIUM BLD-SCNC: 132 MMOL/L (ref 132–146)
WBC # BLD: 5.4 E9/L (ref 4.5–11.5)

## 2020-06-24 PROCEDURE — 6360000002 HC RX W HCPCS: Performed by: NURSE PRACTITIONER

## 2020-06-24 PROCEDURE — 6370000000 HC RX 637 (ALT 250 FOR IP): Performed by: NURSE PRACTITIONER

## 2020-06-24 PROCEDURE — 6370000000 HC RX 637 (ALT 250 FOR IP): Performed by: STUDENT IN AN ORGANIZED HEALTH CARE EDUCATION/TRAINING PROGRAM

## 2020-06-24 PROCEDURE — 97530 THERAPEUTIC ACTIVITIES: CPT

## 2020-06-24 PROCEDURE — 80048 BASIC METABOLIC PNL TOTAL CA: CPT

## 2020-06-24 PROCEDURE — 2580000003 HC RX 258: Performed by: PHYSICIAN ASSISTANT

## 2020-06-24 PROCEDURE — 6360000002 HC RX W HCPCS: Performed by: SURGERY

## 2020-06-24 PROCEDURE — 85025 COMPLETE CBC W/AUTO DIFF WBC: CPT

## 2020-06-24 PROCEDURE — 2700000000 HC OXYGEN THERAPY PER DAY

## 2020-06-24 PROCEDURE — 36415 COLL VENOUS BLD VENIPUNCTURE: CPT

## 2020-06-24 PROCEDURE — 1200000000 HC SEMI PRIVATE

## 2020-06-24 PROCEDURE — 6370000000 HC RX 637 (ALT 250 FOR IP): Performed by: SURGERY

## 2020-06-24 PROCEDURE — 94761 N-INVAS EAR/PLS OXIMETRY MLT: CPT

## 2020-06-24 PROCEDURE — 99232 SBSQ HOSP IP/OBS MODERATE 35: CPT | Performed by: SURGERY

## 2020-06-24 PROCEDURE — 2500000003 HC RX 250 WO HCPCS: Performed by: SURGERY

## 2020-06-24 PROCEDURE — 94640 AIRWAY INHALATION TREATMENT: CPT

## 2020-06-24 PROCEDURE — 94760 N-INVAS EAR/PLS OXIMETRY 1: CPT

## 2020-06-24 PROCEDURE — 97535 SELF CARE MNGMENT TRAINING: CPT

## 2020-06-24 RX ORDER — LIDOCAINE 4 G/G
1 PATCH TOPICAL DAILY
Qty: 10 PATCH | Refills: 0 | Status: CANCELLED | OUTPATIENT
Start: 2020-06-24 | End: 2020-07-04

## 2020-06-24 RX ORDER — SODIUM CHLORIDE 9 MG/ML
INJECTION, SOLUTION INTRAVENOUS CONTINUOUS
Status: DISCONTINUED | OUTPATIENT
Start: 2020-06-24 | End: 2020-06-25

## 2020-06-24 RX ORDER — CEFAZOLIN SODIUM 2 G/50ML
2 SOLUTION INTRAVENOUS SEE ADMIN INSTRUCTIONS
Status: DISCONTINUED | OUTPATIENT
Start: 2020-06-24 | End: 2020-06-25 | Stop reason: HOSPADM

## 2020-06-24 RX ORDER — CEPHALEXIN 500 MG/1
500 CAPSULE ORAL 4 TIMES DAILY
Qty: 6 CAPSULE | Refills: 0 | Status: CANCELLED | OUTPATIENT
Start: 2020-06-24 | End: 2020-06-26

## 2020-06-24 RX ORDER — OXYCODONE HYDROCHLORIDE 5 MG/1
5 TABLET ORAL EVERY 6 HOURS PRN
Qty: 20 TABLET | Refills: 0 | Status: CANCELLED | OUTPATIENT
Start: 2020-06-24 | End: 2020-06-29

## 2020-06-24 RX ORDER — SODIUM CHLORIDE 0.9 % (FLUSH) 0.9 %
10 SYRINGE (ML) INJECTION EVERY 12 HOURS SCHEDULED
Status: DISCONTINUED | OUTPATIENT
Start: 2020-06-24 | End: 2020-06-25 | Stop reason: HOSPADM

## 2020-06-24 RX ORDER — DIAPER,BRIEF,INFANT-TODD,DISP
EACH MISCELLANEOUS
Qty: 30 G | Refills: 1 | Status: CANCELLED | OUTPATIENT
Start: 2020-06-24 | End: 2020-07-03

## 2020-06-24 RX ORDER — SODIUM CHLORIDE 0.9 % (FLUSH) 0.9 %
10 SYRINGE (ML) INJECTION PRN
Status: DISCONTINUED | OUTPATIENT
Start: 2020-06-24 | End: 2020-06-25 | Stop reason: HOSPADM

## 2020-06-24 RX ORDER — METHOCARBAMOL 500 MG/1
1000 TABLET, FILM COATED ORAL 4 TIMES DAILY
Qty: 80 TABLET | Refills: 0 | Status: CANCELLED | OUTPATIENT
Start: 2020-06-24 | End: 2020-07-04

## 2020-06-24 RX ORDER — POLYETHYLENE GLYCOL 3350 17 G/17G
17 POWDER, FOR SOLUTION ORAL DAILY PRN
Qty: 527 G | Refills: 1 | Status: CANCELLED | OUTPATIENT
Start: 2020-06-24 | End: 2020-07-24

## 2020-06-24 RX ADMIN — LAMOTRIGINE 200 MG: 100 TABLET ORAL at 20:55

## 2020-06-24 RX ADMIN — ACETAMINOPHEN 650 MG: 325 TABLET ORAL at 05:47

## 2020-06-24 RX ADMIN — ACYCLOVIR 800 MG: 800 TABLET ORAL at 20:54

## 2020-06-24 RX ADMIN — DIVALPROEX SODIUM 500 MG: 250 TABLET, DELAYED RELEASE ORAL at 20:55

## 2020-06-24 RX ADMIN — DOCUSATE SODIUM 50MG AND SENNOSIDES 8.6MG 1 TABLET: 8.6; 5 TABLET, FILM COATED ORAL at 09:06

## 2020-06-24 RX ADMIN — ENOXAPARIN SODIUM 30 MG: 30 INJECTION SUBCUTANEOUS at 09:07

## 2020-06-24 RX ADMIN — DIVALPROEX SODIUM 500 MG: 250 TABLET, DELAYED RELEASE ORAL at 09:06

## 2020-06-24 RX ADMIN — ACYCLOVIR 800 MG: 800 TABLET ORAL at 11:00

## 2020-06-24 RX ADMIN — IPRATROPIUM BROMIDE 0.5 MG: 0.5 SOLUTION RESPIRATORY (INHALATION) at 13:23

## 2020-06-24 RX ADMIN — CHOLESTYRAMINE 4 G: 4 POWDER, FOR SUSPENSION ORAL at 09:05

## 2020-06-24 RX ADMIN — ACETAMINOPHEN 650 MG: 325 TABLET ORAL at 18:32

## 2020-06-24 RX ADMIN — BACITRACIN ZINC: 500 OINTMENT TOPICAL at 20:58

## 2020-06-24 RX ADMIN — CEPHALEXIN 500 MG: 500 CAPSULE ORAL at 11:40

## 2020-06-24 RX ADMIN — PANTOPRAZOLE SODIUM 40 MG: 40 TABLET, DELAYED RELEASE ORAL at 20:55

## 2020-06-24 RX ADMIN — METHOCARBAMOL TABLETS 1000 MG: 500 TABLET, COATED ORAL at 20:55

## 2020-06-24 RX ADMIN — ACETAMINOPHEN 650 MG: 325 TABLET ORAL at 11:40

## 2020-06-24 RX ADMIN — ACYCLOVIR 800 MG: 800 TABLET ORAL at 05:47

## 2020-06-24 RX ADMIN — FLUOXETINE HYDROCHLORIDE 20 MG: 20 CAPSULE ORAL at 09:04

## 2020-06-24 RX ADMIN — ACYCLOVIR 800 MG: 800 TABLET ORAL at 00:25

## 2020-06-24 RX ADMIN — PANTOPRAZOLE SODIUM 40 MG: 40 TABLET, DELAYED RELEASE ORAL at 09:03

## 2020-06-24 RX ADMIN — BACITRACIN ZINC: 500 OINTMENT TOPICAL at 19:00

## 2020-06-24 RX ADMIN — IPRATROPIUM BROMIDE 0.5 MG: 0.5 SOLUTION RESPIRATORY (INHALATION) at 16:49

## 2020-06-24 RX ADMIN — METHOCARBAMOL TABLETS 1000 MG: 500 TABLET, COATED ORAL at 09:04

## 2020-06-24 RX ADMIN — OXYCODONE HYDROCHLORIDE 10 MG: 10 TABLET ORAL at 20:55

## 2020-06-24 RX ADMIN — ACETAMINOPHEN 650 MG: 325 TABLET ORAL at 23:34

## 2020-06-24 RX ADMIN — ACYCLOVIR 800 MG: 800 TABLET ORAL at 18:31

## 2020-06-24 RX ADMIN — OLANZAPINE 15 MG: 5 TABLET, FILM COATED ORAL at 20:55

## 2020-06-24 RX ADMIN — LAMOTRIGINE 200 MG: 100 TABLET ORAL at 09:04

## 2020-06-24 RX ADMIN — HYDRALAZINE HYDROCHLORIDE 10 MG: 20 INJECTION INTRAMUSCULAR; INTRAVENOUS at 00:25

## 2020-06-24 RX ADMIN — ENOXAPARIN SODIUM 30 MG: 30 INJECTION SUBCUTANEOUS at 20:54

## 2020-06-24 RX ADMIN — CEPHALEXIN 500 MG: 500 CAPSULE ORAL at 18:31

## 2020-06-24 RX ADMIN — CEPHALEXIN 500 MG: 500 CAPSULE ORAL at 05:47

## 2020-06-24 RX ADMIN — ACYCLOVIR 800 MG: 800 TABLET ORAL at 23:35

## 2020-06-24 RX ADMIN — SODIUM CHLORIDE: 9 INJECTION, SOLUTION INTRAVENOUS at 23:33

## 2020-06-24 RX ADMIN — ACETAMINOPHEN 650 MG: 325 TABLET ORAL at 00:25

## 2020-06-24 RX ADMIN — OXYCODONE HYDROCHLORIDE 10 MG: 10 TABLET ORAL at 16:19

## 2020-06-24 RX ADMIN — BACITRACIN ZINC: 500 OINTMENT TOPICAL at 09:06

## 2020-06-24 RX ADMIN — IPRATROPIUM BROMIDE 0.5 MG: 0.5 SOLUTION RESPIRATORY (INHALATION) at 09:44

## 2020-06-24 RX ADMIN — IPRATROPIUM BROMIDE 0.5 MG: 0.5 SOLUTION RESPIRATORY (INHALATION) at 21:21

## 2020-06-24 RX ADMIN — OXYCODONE 5 MG: 5 TABLET ORAL at 09:07

## 2020-06-24 RX ADMIN — MIRTAZAPINE 15 MG: 15 TABLET, FILM COATED ORAL at 21:51

## 2020-06-24 RX ADMIN — CETIRIZINE HYDROCHLORIDE 10 MG: 10 TABLET, FILM COATED ORAL at 09:03

## 2020-06-24 RX ADMIN — CEPHALEXIN 500 MG: 500 CAPSULE ORAL at 00:25

## 2020-06-24 RX ADMIN — SODIUM CHLORIDE, PRESERVATIVE FREE 10 ML: 5 INJECTION INTRAVENOUS at 20:56

## 2020-06-24 RX ADMIN — CEPHALEXIN 500 MG: 500 CAPSULE ORAL at 23:35

## 2020-06-24 RX ADMIN — LISINOPRIL 5 MG: 5 TABLET ORAL at 10:55

## 2020-06-24 RX ADMIN — METHOCARBAMOL TABLETS 1000 MG: 500 TABLET, COATED ORAL at 18:32

## 2020-06-24 RX ADMIN — DOCUSATE SODIUM 50MG AND SENNOSIDES 8.6MG 1 TABLET: 8.6; 5 TABLET, FILM COATED ORAL at 20:54

## 2020-06-24 RX ADMIN — CLONAZEPAM 0.5 MG: 0.5 TABLET ORAL at 20:55

## 2020-06-24 RX ADMIN — LABETALOL HYDROCHLORIDE 10 MG: 5 INJECTION INTRAVENOUS at 16:22

## 2020-06-24 RX ADMIN — METHOCARBAMOL TABLETS 1000 MG: 500 TABLET, COATED ORAL at 12:46

## 2020-06-24 ASSESSMENT — PAIN DESCRIPTION - ONSET
ONSET: ON-GOING
ONSET: ON-GOING

## 2020-06-24 ASSESSMENT — PAIN DESCRIPTION - PAIN TYPE
TYPE: ACUTE PAIN

## 2020-06-24 ASSESSMENT — PAIN DESCRIPTION - LOCATION
LOCATION: BACK

## 2020-06-24 ASSESSMENT — PAIN DESCRIPTION - PROGRESSION: CLINICAL_PROGRESSION: NOT CHANGED

## 2020-06-24 ASSESSMENT — PAIN DESCRIPTION - ORIENTATION: ORIENTATION: MID

## 2020-06-24 ASSESSMENT — PAIN SCALES - GENERAL
PAINLEVEL_OUTOF10: 10
PAINLEVEL_OUTOF10: 8
PAINLEVEL_OUTOF10: 7
PAINLEVEL_OUTOF10: 7
PAINLEVEL_OUTOF10: 10
PAINLEVEL_OUTOF10: 5
PAINLEVEL_OUTOF10: 7
PAINLEVEL_OUTOF10: 7
PAINLEVEL_OUTOF10: 5
PAINLEVEL_OUTOF10: 10

## 2020-06-24 ASSESSMENT — PAIN DESCRIPTION - FREQUENCY
FREQUENCY: CONTINUOUS
FREQUENCY: CONTINUOUS

## 2020-06-24 ASSESSMENT — PAIN DESCRIPTION - DESCRIPTORS
DESCRIPTORS: ACHING;DISCOMFORT;CONSTANT
DESCRIPTORS: ACHING;CONSTANT

## 2020-06-24 ASSESSMENT — PAIN - FUNCTIONAL ASSESSMENT: PAIN_FUNCTIONAL_ASSESSMENT: PREVENTS OR INTERFERES SOME ACTIVE ACTIVITIES AND ADLS

## 2020-06-24 NOTE — CARE COORDINATION
Plan at discharge remains Home with her daughter. No anticipated needs. Therapy in to teach pt how to put TLSO brace on. Her son or daughter will provide transport home. CM will follow for any needs that arise. Any questions I can be reached @ [phone].  Nica Ordaz RN Case Manager

## 2020-06-24 NOTE — PROGRESS NOTES
TRAUMA SURGERY  ATTENDING PROGRESS NOTE      CC: fall down steps     S:   c/o pain in back and face     O:   @BP (!) 184/88   Pulse 95   Temp 96.8 °F (36 °C) (Temporal)   Resp 20   Ht 5' 5\" (1.651 m)   Wt 146 lb (66.2 kg)   SpO2 93%   BMI 24.30 kg/m² @    Gen - no apparent distress   Neuro - Awake, alert, attentive    HEENT - PERRL 3mm, b/l periorbital ecchymosis,   Lungs - non labored, BS clear    Heart - RR   Abdomen - snt   Spine -   Moderate t spine tenderness   Ext- nvi     A/P: s/p fall with facial fx, and T spine fx,       Active Problems:    Thoracic spine fracture (HCC)    Orbit fracture, left    Nasal bone fracture    Fracture of multiple ribs of left side  Resolved Problems:    Trauma      - Face fx,, keflex x 5 days plans for OR tomorrow   - T spine fx, TLSO NS following, non op   - multimodal pain control   - home meds, seizure meds,   - PTOT dc planning     DVT prophylaxis: SCDs, Lovenox Mortimer Nian MD FACS

## 2020-06-24 NOTE — H&P
PLASTIC SURGERY   CONSULT NOTE   6/20/2020   Physician Consulted: Dr. Charis Shrestha   Reason for Consult: Left orbital wall fracture, nasal bone fractures   Referring Physician: Dr. Aziza GTZ   Steffanie Charles is a 50 y.o. female who presents for evaluation of left orbital wall fracture, nasal bone fractures. Patient fell down 5 carpeted steps. She states she doesn't remember the fall she had gotten up to get ice cream last night and the next thing she knew she was on the floor. Does not think she was dizzy prior to fall. Patient is somnolent but arousable. She states everything hurts. She was taken to [facility] where she was found to have a left orbital fracture, nasal bone fractures, and a T9 compression fracture. She was transferred to [address] [place] [address]. Due to her somnolence a repeat CT head was obtained, negative for intracranial hemorrhage. Patient complains of mild blurry vision left eye. Denies diplopia. Past Medical History   No past medical history on file. Past Surgical History   No past surgical history on file. Medications Prior to Admission:   Home Medications   Prior to Admission medications    Not on File     Allergies no known allergies   Family History   No family history on file. Social History          Tobacco Use    Smoking status: Not on file   Substance Use Topics    Alcohol use: Not on file    Drug use: Not on file     Review of Systems   General ROS: negative   Hematological and Lymphatic ROS: negative   Respiratory ROS: negative   Cardiovascular ROS: negative   Gastrointestinal ROS: negative   Genito-Urinary ROS: negative   Musculoskeletal ROS: pain every where   PHYSICAL EXAM:       Vitals:    06/20/20 1000   BP: (!) 155/90   Pulse: 80   Resp: 16   Temp:    SpO2: 94%     General Appearance: Awake, alert, oriented, in no acute distress   Skin: Ecchymosis and edema periorbital   Head/face: Laceration to forehead s/p repair with nylon sutures, bilateral periorbital edema and ecchymosis. Nasal ecchymosis, tender to palpation. Eyes: PERRL, H test intact, minimal pain left eye with movement in all direction. EOMI   Lungs: Right chest tender to palpation,    Heart: Heart regular rate and rhythm   Abdomen: Soft, NT, ND   Extremities: pulses present in all extremities   LABS:   CBC       Recent Labs    20   0709   WBC 15.4*   HGB 9.4*   HCT 27.1*        BMP       Recent Labs    20   0709   *   K 3.9   CL 84*   CO2 25   BUN 5*   CREATININE 0.5   CALCIUM 8.9     Liver Function       Recent Labs    20   0709   BILITOT 0.2   AST 31   ALT 15   ALKPHOS 87   PROT 6.6   LABALBU 3.6     No results for input(s): LACTATE in the last 72 hours. No results for input(s): INR, PTT in the last 72 hours. Invalid input(s): PT   RADIOLOGY   Xr Pelvis (1-2 Views)   Result Date: 2020   Patient MRN: [id number] : 1965 Age: 50 years Gender: Female Order Date: 2020 10:30 AM EXAM: XR PELVIS (1-2 VIEWS) NUMBER OF IMAGES: 1 INDICATION: trauma, fall trauma, fall COMPARISON: None TECHNIQUE: AP view of the pelvis. FINDINGS: Slight irregularity noted in the right superior and inferior pubic rami. No dislocation seen. Bones are diffusely osteopenic. There are degenerative changes in the lower lumbar spine, SI joints, and pubic symphysis. Nonobstructed bowel gas pattern. Multiple pelvic phleboliths. Slight irregularity in the right superior and inferior pubic rami may be indicative of nondisplaced fractures versus artifact from overlying tissues. Ct Head Wo Contrast   Result Date: 2020   Patient MRN: [id number] : 1965 Age: 50 years Gender: Female Order Date: 2020 7:15 AM EXAM: CT HEAD WO CONTRAST NUMBER OF IMAGES: 111 INDICATION: Trauma -known cranial facial trauma Trauma -known cranial facial trauma COMPARISON: None Technique: Low-dose CT acquisition technique included one of following options; 1 . Automated exposure control, 2.  Adjustment of MA and or KV according to patient's size or 3. Use of iterative reconstruction. Multiple CT sections were obtained with sagittal and coronal MPR reconstructions. The ventricles are unremarkable. The gyri and sulci appear unremarkable. The white matter appears unremarkable. There is no evidence for hemorrhage. There is no large vascular territory infarct identified. There is no mass effect identified. There is no large, space-occupying mass identified. Soft tissue edema is noted overlying the left frontal convexity. There is partial opacification of the left frontal sinuses, the ethmoid air cells, and the right sphenoid sinus. 1. No acute intracranial hemorrhage is identified. 2. Soft tissue edema is noted overlying the left frontal convexity. There is partial opacification of the left frontal sinuses, the ethmoid air cells, and the right sphenoid sinus. Ct Facial Bones Wo Contrast   Addendum Date: 2020   Addendum: Multiple nasal fractures are noted. Result Date: 2020   Patient MRN: [id number] : 1965 Age: 50 years Gender: Female Order Date: 2020 7:15 AM EXAM: CT FACIAL BONES WO CONTRAST NUMBER OF IMAGES: 469 INDICATION: Trauma Trauma COMPARISON: None CT Scan of the Facial bones with Coronal MPR Reconstructions: Technique: Low-dose CT acquisition technique included one of following options; 1 . Automated exposure control, 2. Adjustment of MA and or KV according to patient's size or 3. Use of iterative reconstruction. There is a minimally displaced fracture of the superior aspect of the left orbit. Soft tissue edema is noted overlying the left frontal convexity. There is partial opacification of the left frontal sinuses, the ethmoid air cells, and the right sphenoid sinus. 1. There is a minimally displaced fracture of the superior aspect of the left orbit. 2. Paranasal sinus disease as detailed above.    Ct Chest W Contrast   Result Date: 2020   Patient MRN: [id number] : 1965 Age: 50 years Gender: Female Order Date: 6/20/2020 10:45 AM EXAM: CT CHEST W CONTRAST NUMBER OF IMAGES: 273 INDICATION: trauma, fall trauma, fall COMPARISON: None Technique: Spiral CT acquisition of the chest from the thoracic inlet to the upper abdomen with contrast. Contrast: 110 mL of Isovue-370 injected intravenously. CT Dose-Length Product: 298 mGy*cm CT Dose Reduction Employed: Yes RESULT: Limitations: None. Lines, tubes, and devices: None. Lung parenchyma and pleura: Central airways are patent. There are scattered centrilobular nodules and groundglass opacities within the right lung, and mostly within the right lower lobe. These appear to have an infectious/inflammatory appearance. Alternatively, aspiration could account for these findings. For reference, nodule in the right lower lobe measures 7 mm (series 3 image 41), and within the right upper lobe measures 6 mm (series 3 image 10). There is bibasilar atelectasis. No pleural effusion or pneumothorax. Thoracic inlet, heart, and mediastinum: Visualized thyroid is unremarkable. No lymphadenopathy in the axillary, mediastinal, or hilar regions. The thoracic aorta and main pulmonary artery are normal in caliber. The cardiac chambers are normal in size. No coronary artery atherosclerotic calcifications are noted, although the study is not optimized for coronary assessment. No pericardial effusion or thickening. Bones and soft tissues: There is a compression deformity involving the T9 vertebral body, with approximately 50% loss of height. There is involvement of the posterior elements on the right, with fracture extending into the pars interarticulares. There are multiple left-sided rib fractures of the left third, fourth, fifth, sixth ribs. Upper abdomen: This will be dictated separately   Compression deformity of the T9 vertebral body, with approximately 50% loss of height.  There is involvement of the posterior elements on the right, with fracture extending into the pars

## 2020-06-24 NOTE — PROGRESS NOTES
Physical Therapy     Name: Katlyn Coulter  : 1965  MRN: [id number]    Referring Provider:  Lexus Ellis MD    Date of Service: 2020    Evaluating PT:  Leigh Velázquez PT, DPT    Room #:  4182/2256-F  Diagnosis:  Trauma  PMHx/PSHx:  Crohn's colitis, COPD, Bipolar 1 disorder  Procedure/Surgery:  T9 compression fracture, Multiple facial fractures, B black eyes, forehead/nose abrasions, L 3,4,5,6 rib fractures  Precautions:  Falls, TLSO when OOB, Spine, Log roll  Equipment Needs:  TBD    SUBJECTIVE:    Pt lives with her son in a 2 story home with 2 stairs to enter and 1 rail(s). Bed is on 1st floor and bath is on 1st floor. Basement laundry with flight and 1 rail(s). Pt ambulated with no AD PTA. Pt reported independent with ADLs. Pt is not actively driving. Pt reported having multiple recent falls \"while sleeping\". OBJECTIVE:   Initial Evaluation  Date: 2020 Treatment  Date  Short Term/ Long Term   Goals   AM-PAC 6 Clicks     Was pt agreeable to Eval/treatment? Yes  yes    Does pt have pain? Reported 10/10 HA pain and 10/10 back pain with mobility. Pain  Medication administered prior to assessment. 7/10  fx region  Mid back    Bed Mobility  Rolling: Min A  Supine to sit: Mod A  Sit to supine:  Mod A  Scooting: Min A Rolling sba  Supine to sit sba/cga cues  Sit to supine sba   Scooting  sba SBA   Transfers Sit to stand: Min A  Stand to sit: Min A  Stand pivot: NT Sit to stand sba/supervision  Stand to sit sba/sup  Stand pivot sba/s no device SBA   Ambulation    3 feet fwd/bkwd/laterally with no AD with Min A 150 and 200 feet no device s >150 feet with AAD if needed with SBA   Stair negotiation: ascended and descended  NT 8 steps l hand rail with sideways approach sba/s 10 steps with 1 rail with SBA   ROM BUE:  WFL  BLE:  WFL   nt    Strength BUE:  Per OT  BLE:  4/5   nt  Increase 1/3 grade   Balance Sitting EOB:  SBA  Dynamic Standing:  Min A Sitting eob independent  Dynamic standing sba/s Sitting EOB:  Supervision  Dynamic Standing:  SBA     Pt is A & O x 3  Sensation:  Pt denied numbness and tingling to extremities  Edema: Moderate facial edema, R eye swollen     Therapeutic Exercises:  Function, laq, ankle pumps     Patient education  Donning brace, function    Patient response to education:   Pt verbalized understanding Pt demonstrated skill Pt requires further education in this area   Yes  Yes with verbal cues and assist Yes      ASSESSMENT:    Comments:  Patient cleared by RN and agreeable to treatment. Pt up in chair upon arrival with no brace on. Pt returned to bed and donned brace with assist.  Pt not sitting right and had to adjust brace and earnest again with success. Pt then performed pivot to chair. pt up sitting in chair will return later to work with pt. Pt in chair and had to adjust brace again. Pt then ambulalted and performed steps with good technique. Pt reports may be going to her house has 2 sons who live with her. Offered to do family education with pt today or tomorrow and she will have to let staff know and  Can educate family in donning brace. Pt having sx procedure tomorrow and she said her family should be in tomorrow. Pt demonstrated partial car transfer with instruction using simulated car. Educated pt on TLSO brace and how to doff. Educated pt on spinal precautions. Pt up sitting in chair after rx. Treatment:  Patient practiced and was instructed in the following treatment:     Bed mobility: Verbal/tactile cues for sequencing BUEs/BLEs for safe technique with rolling/supine<>sit task. Verbal cues for spine precautions/log roll.  Stair training  - cues for proper safe technique   Simulated car transfer   Pt education on wearing brace oob, function      PLAN:    PT care will be provided in accordance with the objectives noted above.  Exercises and functional mobility practice will be used as well as appropriate assistive devices or modalities to obtain goals. Patient and family education will also be administered as needed. Frequency of treatments: 2-5x/week x 1-2 weeks.     Time in 1025 to 1045  And 1103  To 1130           CPT codes:  [] Low Complexity PT evaluation 15345  [] Moderate Complexity PT evaluation 88543  [] High Complexity PT evaluation 52938  [] PT Re-evaluation 11927  [] Gait training 11770 - minutes  [] Manual therapy 57960 - minutes  [x] Therapeutic activities 30027 45 minutes  [] Therapeutic exercises 10095 - minutes  [] Neuromuscular reeducation 28943 - minutes     [place], PTA  [id number]

## 2020-06-25 ENCOUNTER — ANESTHESIA (OUTPATIENT)
Dept: OPERATING ROOM | Age: 55
DRG: 115 | End: 2020-06-25
Payer: MEDICAID

## 2020-06-25 VITALS
RESPIRATION RATE: 16 BRPM | SYSTOLIC BLOOD PRESSURE: 167 MMHG | BODY MASS INDEX: 24.32 KG/M2 | HEART RATE: 107 BPM | TEMPERATURE: 98.2 F | HEIGHT: 65 IN | OXYGEN SATURATION: 96 % | WEIGHT: 146 LBS | DIASTOLIC BLOOD PRESSURE: 94 MMHG

## 2020-06-25 VITALS — OXYGEN SATURATION: 90 % | DIASTOLIC BLOOD PRESSURE: 102 MMHG | SYSTOLIC BLOOD PRESSURE: 177 MMHG

## 2020-06-25 LAB
ACANTHOCYTES: ABNORMAL
ANION GAP SERPL CALCULATED.3IONS-SCNC: 14 MMOL/L (ref 7–16)
ANISOCYTOSIS: ABNORMAL
BASOPHILS ABSOLUTE: 0.09 E9/L (ref 0–0.2)
BASOPHILS RELATIVE PERCENT: 1.7 % (ref 0–2)
BLOOD CULTURE, ROUTINE: NORMAL
BUN BLDV-MCNC: 6 MG/DL (ref 6–20)
BURR CELLS: ABNORMAL
CALCIUM SERPL-MCNC: 8.9 MG/DL (ref 8.6–10.2)
CHLORIDE BLD-SCNC: 99 MMOL/L (ref 98–107)
CO2: 22 MMOL/L (ref 22–29)
CREAT SERPL-MCNC: 0.5 MG/DL (ref 0.5–1)
CULTURE, BLOOD 2: NORMAL
EOSINOPHILS ABSOLUTE: 0.09 E9/L (ref 0.05–0.5)
EOSINOPHILS RELATIVE PERCENT: 1.7 % (ref 0–6)
GFR AFRICAN AMERICAN: >60
GFR NON-AFRICAN AMERICAN: >60 ML/MIN/1.73
GLUCOSE BLD-MCNC: 87 MG/DL (ref 74–99)
HCT VFR BLD CALC: 27.4 % (ref 34–48)
HEMOGLOBIN: 8.7 G/DL (ref 11.5–15.5)
LYMPHOCYTES ABSOLUTE: 2.75 E9/L (ref 1.5–4)
LYMPHOCYTES RELATIVE PERCENT: 54.8 % (ref 20–42)
MCH RBC QN AUTO: 27.7 PG (ref 26–35)
MCHC RBC AUTO-ENTMCNC: 31.8 % (ref 32–34.5)
MCV RBC AUTO: 87.3 FL (ref 80–99.9)
MONOCYTES ABSOLUTE: 0.2 E9/L (ref 0.1–0.95)
MONOCYTES RELATIVE PERCENT: 4.3 % (ref 2–12)
NEUTROPHILS ABSOLUTE: 1.85 E9/L (ref 1.8–7.3)
NEUTROPHILS RELATIVE PERCENT: 37.4 % (ref 43–80)
OVALOCYTES: ABNORMAL
PDW BLD-RTO: 15.1 FL (ref 11.5–15)
PLATELET # BLD: 501 E9/L (ref 130–450)
PMV BLD AUTO: 8 FL (ref 7–12)
POIKILOCYTES: ABNORMAL
POLYCHROMASIA: ABNORMAL
POTASSIUM REFLEX MAGNESIUM: 4.7 MMOL/L (ref 3.5–5)
RBC # BLD: 3.14 E12/L (ref 3.5–5.5)
SCHISTOCYTES: ABNORMAL
SODIUM BLD-SCNC: 135 MMOL/L (ref 132–146)
WBC # BLD: 5 E9/L (ref 4.5–11.5)

## 2020-06-25 PROCEDURE — 3600000012 HC SURGERY LEVEL 2 ADDTL 15MIN: Performed by: PLASTIC SURGERY

## 2020-06-25 PROCEDURE — 2709999900 HC NON-CHARGEABLE SUPPLY: Performed by: PLASTIC SURGERY

## 2020-06-25 PROCEDURE — 2580000003 HC RX 258: Performed by: NURSE PRACTITIONER

## 2020-06-25 PROCEDURE — 80048 BASIC METABOLIC PNL TOTAL CA: CPT

## 2020-06-25 PROCEDURE — 99232 SBSQ HOSP IP/OBS MODERATE 35: CPT | Performed by: SURGERY

## 2020-06-25 PROCEDURE — 6360000002 HC RX W HCPCS: Performed by: PHYSICIAN ASSISTANT

## 2020-06-25 PROCEDURE — 6370000000 HC RX 637 (ALT 250 FOR IP): Performed by: STUDENT IN AN ORGANIZED HEALTH CARE EDUCATION/TRAINING PROGRAM

## 2020-06-25 PROCEDURE — 2580000003 HC RX 258

## 2020-06-25 PROCEDURE — 6360000002 HC RX W HCPCS

## 2020-06-25 PROCEDURE — 6370000000 HC RX 637 (ALT 250 FOR IP): Performed by: NURSE PRACTITIONER

## 2020-06-25 PROCEDURE — 97530 THERAPEUTIC ACTIVITIES: CPT

## 2020-06-25 PROCEDURE — 6360000002 HC RX W HCPCS: Performed by: ANESTHESIOLOGY

## 2020-06-25 PROCEDURE — 3600000002 HC SURGERY LEVEL 2 BASE: Performed by: PLASTIC SURGERY

## 2020-06-25 PROCEDURE — 3700000000 HC ANESTHESIA ATTENDED CARE: Performed by: PLASTIC SURGERY

## 2020-06-25 PROCEDURE — 2500000003 HC RX 250 WO HCPCS: Performed by: PLASTIC SURGERY

## 2020-06-25 PROCEDURE — 0NSBXZZ REPOSITION NASAL BONE, EXTERNAL APPROACH: ICD-10-PCS | Performed by: PLASTIC SURGERY

## 2020-06-25 PROCEDURE — 2700000000 HC OXYGEN THERAPY PER DAY

## 2020-06-25 PROCEDURE — 97535 SELF CARE MNGMENT TRAINING: CPT

## 2020-06-25 PROCEDURE — 2580000003 HC RX 258: Performed by: PHYSICIAN ASSISTANT

## 2020-06-25 PROCEDURE — 36415 COLL VENOUS BLD VENIPUNCTURE: CPT

## 2020-06-25 PROCEDURE — 7100000000 HC PACU RECOVERY - FIRST 15 MIN: Performed by: PLASTIC SURGERY

## 2020-06-25 PROCEDURE — 3700000001 HC ADD 15 MINUTES (ANESTHESIA): Performed by: PLASTIC SURGERY

## 2020-06-25 PROCEDURE — 7100000001 HC PACU RECOVERY - ADDTL 15 MIN: Performed by: PLASTIC SURGERY

## 2020-06-25 PROCEDURE — 85025 COMPLETE CBC W/AUTO DIFF WBC: CPT

## 2020-06-25 PROCEDURE — 2500000003 HC RX 250 WO HCPCS

## 2020-06-25 PROCEDURE — 21320 CLSD TX NSL FX W/MNPJ&STABLJ: CPT | Performed by: PLASTIC SURGERY

## 2020-06-25 RX ORDER — LABETALOL HYDROCHLORIDE 5 MG/ML
INJECTION, SOLUTION INTRAVENOUS PRN
Status: DISCONTINUED | OUTPATIENT
Start: 2020-06-25 | End: 2020-06-25 | Stop reason: SDUPTHER

## 2020-06-25 RX ORDER — SODIUM CHLORIDE 9 MG/ML
INJECTION, SOLUTION INTRAVENOUS CONTINUOUS PRN
Status: DISCONTINUED | OUTPATIENT
Start: 2020-06-25 | End: 2020-06-25 | Stop reason: SDUPTHER

## 2020-06-25 RX ORDER — MORPHINE SULFATE 2 MG/ML
2 INJECTION, SOLUTION INTRAMUSCULAR; INTRAVENOUS EVERY 5 MIN PRN
Status: DISCONTINUED | OUTPATIENT
Start: 2020-06-25 | End: 2020-06-25 | Stop reason: HOSPADM

## 2020-06-25 RX ORDER — LIDOCAINE HYDROCHLORIDE AND EPINEPHRINE 10; 10 MG/ML; UG/ML
INJECTION, SOLUTION INFILTRATION; PERINEURAL PRN
Status: DISCONTINUED | OUTPATIENT
Start: 2020-06-25 | End: 2020-06-25 | Stop reason: ALTCHOICE

## 2020-06-25 RX ORDER — HYDRALAZINE HYDROCHLORIDE 20 MG/ML
5 INJECTION INTRAMUSCULAR; INTRAVENOUS EVERY 10 MIN PRN
Status: DISCONTINUED | OUTPATIENT
Start: 2020-06-25 | End: 2020-06-25 | Stop reason: HOSPADM

## 2020-06-25 RX ORDER — KETAMINE HCL IN NACL, ISO-OSM 100MG/10ML
SYRINGE (ML) INJECTION PRN
Status: DISCONTINUED | OUTPATIENT
Start: 2020-06-25 | End: 2020-06-25 | Stop reason: SDUPTHER

## 2020-06-25 RX ORDER — PROPOFOL 10 MG/ML
INJECTION, EMULSION INTRAVENOUS PRN
Status: DISCONTINUED | OUTPATIENT
Start: 2020-06-25 | End: 2020-06-25 | Stop reason: SDUPTHER

## 2020-06-25 RX ORDER — MEPERIDINE HYDROCHLORIDE 25 MG/ML
12.5 INJECTION INTRAMUSCULAR; INTRAVENOUS; SUBCUTANEOUS EVERY 5 MIN PRN
Status: DISCONTINUED | OUTPATIENT
Start: 2020-06-25 | End: 2020-06-25

## 2020-06-25 RX ORDER — METHOCARBAMOL 500 MG/1
1000 TABLET, FILM COATED ORAL 4 TIMES DAILY
Qty: 80 TABLET | Refills: 0 | Status: SHIPPED | OUTPATIENT
Start: 2020-06-25 | End: 2020-07-05

## 2020-06-25 RX ORDER — MIDAZOLAM HYDROCHLORIDE 1 MG/ML
INJECTION INTRAMUSCULAR; INTRAVENOUS PRN
Status: DISCONTINUED | OUTPATIENT
Start: 2020-06-25 | End: 2020-06-25 | Stop reason: SDUPTHER

## 2020-06-25 RX ORDER — MEPERIDINE HYDROCHLORIDE 25 MG/ML
12.5 INJECTION INTRAMUSCULAR; INTRAVENOUS; SUBCUTANEOUS EVERY 5 MIN PRN
Status: DISCONTINUED | OUTPATIENT
Start: 2020-06-25 | End: 2020-06-25 | Stop reason: HOSPADM

## 2020-06-25 RX ORDER — PROMETHAZINE HYDROCHLORIDE 25 MG/ML
6.25 INJECTION, SOLUTION INTRAMUSCULAR; INTRAVENOUS
Status: DISCONTINUED | OUTPATIENT
Start: 2020-06-25 | End: 2020-06-25

## 2020-06-25 RX ORDER — GLYCOPYRROLATE 1 MG/5 ML
SYRINGE (ML) INTRAVENOUS PRN
Status: DISCONTINUED | OUTPATIENT
Start: 2020-06-25 | End: 2020-06-25 | Stop reason: SDUPTHER

## 2020-06-25 RX ORDER — LABETALOL HYDROCHLORIDE 5 MG/ML
5 INJECTION, SOLUTION INTRAVENOUS EVERY 10 MIN PRN
Status: DISCONTINUED | OUTPATIENT
Start: 2020-06-25 | End: 2020-06-25 | Stop reason: HOSPADM

## 2020-06-25 RX ORDER — LABETALOL HYDROCHLORIDE 5 MG/ML
5 INJECTION, SOLUTION INTRAVENOUS EVERY 10 MIN PRN
Status: DISCONTINUED | OUTPATIENT
Start: 2020-06-25 | End: 2020-06-25

## 2020-06-25 RX ORDER — DIAPER,BRIEF,INFANT-TODD,DISP
EACH MISCELLANEOUS
Qty: 30 G | Refills: 1 | Status: SHIPPED | OUTPATIENT
Start: 2020-06-25 | End: 2020-07-05

## 2020-06-25 RX ORDER — HYDRALAZINE HYDROCHLORIDE 20 MG/ML
5 INJECTION INTRAMUSCULAR; INTRAVENOUS EVERY 10 MIN PRN
Status: DISCONTINUED | OUTPATIENT
Start: 2020-06-25 | End: 2020-06-25

## 2020-06-25 RX ORDER — OXYCODONE HYDROCHLORIDE 10 MG/1
10 TABLET ORAL EVERY 8 HOURS PRN
Qty: 21 TABLET | Refills: 0 | Status: SHIPPED | OUTPATIENT
Start: 2020-06-25 | End: 2020-07-02

## 2020-06-25 RX ORDER — LIDOCAINE 4 G/G
1 PATCH TOPICAL DAILY
Qty: 1 BOX | Refills: 1 | Status: SHIPPED | OUTPATIENT
Start: 2020-06-26

## 2020-06-25 RX ORDER — PROMETHAZINE HYDROCHLORIDE 25 MG/ML
6.25 INJECTION, SOLUTION INTRAMUSCULAR; INTRAVENOUS
Status: DISCONTINUED | OUTPATIENT
Start: 2020-06-25 | End: 2020-06-25 | Stop reason: HOSPADM

## 2020-06-25 RX ADMIN — PROPOFOL 20 MG: 10 INJECTION, EMULSION INTRAVENOUS at 11:05

## 2020-06-25 RX ADMIN — ACYCLOVIR 800 MG: 800 TABLET ORAL at 06:22

## 2020-06-25 RX ADMIN — DOCUSATE SODIUM 50MG AND SENNOSIDES 8.6MG 1 TABLET: 8.6; 5 TABLET, FILM COATED ORAL at 09:24

## 2020-06-25 RX ADMIN — Medication 0.2 MG: at 11:01

## 2020-06-25 RX ADMIN — SODIUM CHLORIDE: 9 INJECTION, SOLUTION INTRAVENOUS at 11:01

## 2020-06-25 RX ADMIN — OXYCODONE HYDROCHLORIDE 10 MG: 10 TABLET ORAL at 05:58

## 2020-06-25 RX ADMIN — CEPHALEXIN 500 MG: 500 CAPSULE ORAL at 13:03

## 2020-06-25 RX ADMIN — SODIUM CHLORIDE: 9 INJECTION, SOLUTION INTRAVENOUS at 06:01

## 2020-06-25 RX ADMIN — METHOCARBAMOL TABLETS 1000 MG: 500 TABLET, COATED ORAL at 09:24

## 2020-06-25 RX ADMIN — CEFAZOLIN SODIUM 2 G: 2 SOLUTION INTRAVENOUS at 11:06

## 2020-06-25 RX ADMIN — ACYCLOVIR 800 MG: 800 TABLET ORAL at 13:03

## 2020-06-25 RX ADMIN — LAMOTRIGINE 200 MG: 100 TABLET ORAL at 09:24

## 2020-06-25 RX ADMIN — ACETAMINOPHEN 650 MG: 325 TABLET ORAL at 05:58

## 2020-06-25 RX ADMIN — OXYCODONE HYDROCHLORIDE 10 MG: 10 TABLET ORAL at 01:26

## 2020-06-25 RX ADMIN — MIDAZOLAM 1 MG: 1 INJECTION INTRAMUSCULAR; INTRAVENOUS at 11:01

## 2020-06-25 RX ADMIN — LISINOPRIL 5 MG: 5 TABLET ORAL at 09:24

## 2020-06-25 RX ADMIN — HYDRALAZINE HYDROCHLORIDE 5 MG: 20 INJECTION INTRAMUSCULAR; INTRAVENOUS at 11:56

## 2020-06-25 RX ADMIN — PROPOFOL 20 MG: 10 INJECTION, EMULSION INTRAVENOUS at 11:06

## 2020-06-25 RX ADMIN — PANTOPRAZOLE SODIUM 40 MG: 40 TABLET, DELAYED RELEASE ORAL at 09:25

## 2020-06-25 RX ADMIN — ACETAMINOPHEN 650 MG: 325 TABLET ORAL at 13:08

## 2020-06-25 RX ADMIN — OXYCODONE HYDROCHLORIDE 10 MG: 10 TABLET ORAL at 13:08

## 2020-06-25 RX ADMIN — DIVALPROEX SODIUM 500 MG: 250 TABLET, DELAYED RELEASE ORAL at 09:25

## 2020-06-25 RX ADMIN — Medication 10 ML: at 09:35

## 2020-06-25 RX ADMIN — Medication 10 MG: at 11:05

## 2020-06-25 RX ADMIN — METHOCARBAMOL TABLETS 1000 MG: 500 TABLET, COATED ORAL at 13:04

## 2020-06-25 RX ADMIN — LABETALOL HYDROCHLORIDE 5 MG: 5 INJECTION INTRAVENOUS at 11:08

## 2020-06-25 RX ADMIN — CEPHALEXIN 500 MG: 500 CAPSULE ORAL at 05:58

## 2020-06-25 RX ADMIN — FLUOXETINE HYDROCHLORIDE 20 MG: 20 CAPSULE ORAL at 09:24

## 2020-06-25 RX ADMIN — CETIRIZINE HYDROCHLORIDE 10 MG: 10 TABLET, FILM COATED ORAL at 09:24

## 2020-06-25 ASSESSMENT — PAIN DESCRIPTION - DESCRIPTORS: DESCRIPTORS: ACHING;DISCOMFORT;SORE

## 2020-06-25 ASSESSMENT — PULMONARY FUNCTION TESTS
PIF_VALUE: 0

## 2020-06-25 ASSESSMENT — PAIN DESCRIPTION - LOCATION
LOCATION: BACK

## 2020-06-25 ASSESSMENT — PAIN SCALES - GENERAL
PAINLEVEL_OUTOF10: 0
PAINLEVEL_OUTOF10: 4
PAINLEVEL_OUTOF10: 6
PAINLEVEL_OUTOF10: 8
PAINLEVEL_OUTOF10: 10
PAINLEVEL_OUTOF10: 9
PAINLEVEL_OUTOF10: 0
PAINLEVEL_OUTOF10: 0

## 2020-06-25 ASSESSMENT — PAIN DESCRIPTION - FREQUENCY: FREQUENCY: CONTINUOUS

## 2020-06-25 ASSESSMENT — PAIN DESCRIPTION - PAIN TYPE
TYPE: ACUTE PAIN

## 2020-06-25 ASSESSMENT — LIFESTYLE VARIABLES: SMOKING_STATUS: 1

## 2020-06-25 ASSESSMENT — PAIN DESCRIPTION - ORIENTATION: ORIENTATION: MID

## 2020-06-25 ASSESSMENT — PAIN DESCRIPTION - ONSET: ONSET: ON-GOING

## 2020-06-25 NOTE — PROGRESS NOTES
session, pt sitting upright in chair, all tubes and lines intact, call light within reach. · Pt has made good progress towards set goals.      · Continue with current plan of care    Treatment Time In: 8:15am           Treatment Time Out: 9:10am             Treatment Charges: Mins Units   Ther Ex  28354     Manual Therapy 24490     Thera Activities 40278 15 1   ADL/Home Mgt 56527 98 3   Neuro Re-ed 67930     Group Therapy      Orthotic manage/training  56110     Non-Billable Time     Total Timed Treatment 55 4       Lala LYONS/L 48227

## 2020-06-25 NOTE — PROGRESS NOTES
Neurosurg progress note  VITALS:  BP (!) 167/94   Pulse 107   Temp 98.2 °F (36.8 °C) (Temporal)   Resp 16   Ht 5' 5\" (1.651 m)   Wt 146 lb (66.2 kg)   SpO2 96%   BMI 24.30 kg/m²   24HR INTAKE/OUTPUT:    Intake/Output Summary (Last 24 hours) at 2020 1419  Last data filed at 2020 1111  Gross per 24 hour   Intake 300 ml   Output 5 ml   Net 295 ml     Xr Pelvis (1-2 Views)    Result Date: 2020  Patient MRN:  [id number] : 1965 Age: 50 years Gender: Female Order Date:  2020 10:30 AM EXAM: XR PELVIS (1-2 VIEWS) NUMBER OF IMAGES:   1  INDICATION:  trauma, fall trauma, fall COMPARISON: None TECHNIQUE: AP view of the pelvis. FINDINGS: Slight irregularity noted in the right superior and inferior pubic rami. No dislocation seen. Bones are diffusely osteopenic. There are degenerative changes in the lower lumbar spine, SI joints, and pubic symphysis. Nonobstructed bowel gas pattern. Multiple pelvic phleboliths. Slight irregularity in the right superior and inferior pubic rami may be indicative of nondisplaced fractures versus artifact from overlying tissues. Xr Shoulder Right (min 2 Views)    Result Date: 2020  Patient MRN:  [id number] : 1965 Age: 50 years Gender: Female Order Date:  2020 11:30 AM EXAM: XR SHOULDER RIGHT (MIN 2 VIEWS) NUMBER OF IMAGES:   2  INDICATION:  trauma trauma COMPARISON: None TECHNIQUE: AP and axillary Y views of the right shoulder. FINDINGS: No fracture or dislocation seen. Bones are diffusely osteopenic. Soft tissues are unremarkable. Visualized lungs are clear. There are moderate degenerative changes in the right AC joint. No acute osseous abnormality seen.      Ct Head Wo Contrast    Result Date: 2020  Patient MRN:  [id number] : 1965 Age: 50 years Gender: Female Order Date:  2020 7:15 AM EXAM: CT HEAD WO CONTRAST NUMBER OF IMAGES:  111 INDICATION:  Trauma -known cranial facial trauma Trauma -known cranial facial trauma COMPARISON: None Technique: Low-dose CT  acquisition technique included one of following options; 1 . Automated exposure control, 2. Adjustment of MA and or KV according to patient's size or 3. Use of iterative reconstruction. Multiple CT sections were obtained with sagittal and coronal MPR reconstructions. The ventricles are unremarkable. The gyri and sulci appear unremarkable. The white matter appears unremarkable. There is no evidence for hemorrhage. There is no large vascular territory infarct identified. There is no mass effect identified. There is no large, space-occupying mass identified. Soft tissue edema is noted overlying the left frontal convexity. There is partial opacification of the left frontal sinuses, the ethmoid air cells, and the right sphenoid sinus. 1. No acute intracranial hemorrhage is identified. 2. Soft tissue edema is noted overlying the left frontal convexity. There is partial opacification of the left frontal sinuses, the ethmoid air cells, and the right sphenoid sinus. Ct Facial Bones Wo Contrast    Addendum Date: 2020    Addendum: Multiple nasal fractures are noted. Result Date: 2020  Patient MRN:  [id number] : 1965 Age: 50 years Gender: Female Order Date:  2020 7:15 AM EXAM: CT FACIAL BONES WO CONTRAST NUMBER OF IMAGES:  469 INDICATION:  Trauma Trauma COMPARISON: None CT Scan of the Facial bones with Coronal MPR Reconstructions: Technique: Low-dose CT  acquisition technique included one of following options; 1 . Automated exposure control, 2. Adjustment of MA and or KV according to patient's size or 3. Use of iterative reconstruction. There is a minimally displaced fracture of the superior aspect of the left orbit. Soft tissue edema is noted overlying the left frontal convexity. There is partial opacification of the left frontal sinuses, the ethmoid air cells, and the right sphenoid sinus.      1. There is a minimally displaced fracture of the superior Compression deformity of the T9 vertebral body, with approximately 50% loss of height. There is involvement of the posterior elements on the right, with fracture extending into the pars interarticulares. Multiple left-sided rib fractures from the 3rd through the 6th ribs. Centrilobular nodules and groundglass opacities asymmetrically involving the right lung, and mostly the right lower lobe. These have an infectious/inflammatory appearance, although, aspiration could account for these findings. Follow-up to resolution, to rule out any underlying process (and insure resolution of these nodules). Mri Thoracic Spine Wo Contrast    Result Date: 2020  Patient MRN: [id number] : 1965 Age:  50 years Gender: Female Order Date: 2020 1:30 PM Exam: MRI THORACIC SPINE WO CONTRAST Number of Images: views Indication:   T9 compression fracture evaluation T9 compression fracture evaluation Comparison: CT of the chest, 2019. FINDINGS: Only the  images and the sagittal T2-weighted sequence of the thoracic spine was obtained from the patient chose to discontinue this study. While evaluation is limited to, there sequence could not be obtained, the T2 hyperintense signal in the T9 vertebral body appears more prominent than it does in the remainder of the vertebral bodies and likely represents edema in the setting of acute or subacute fracture. There is approximately 25% loss of height. There is minimal buckling of the posterior wall of the T9 vertebral body. No significant retropulsion of fracture fragments is seen. No central canal or neural foraminal stenosis is appreciated in the thoracic spine on this single sequence obtained. The visualized cord appears unremarkable. Within the limits of this study, the compression fracture in the T9 vertebral body is likely acute or subacute. If indicated, sagittal STIR sequence may be obtained for further evaluation.     Ct Abdomen Pelvis W Iv Contrast ([facility] [address]    Orbit fracture, left    Nasal bone fracture    Fracture of multiple ribs of left side     Plan:Continue current care  Suman Martinez M.D.

## 2020-06-25 NOTE — ANESTHESIA PRE PROCEDURE
Department of Anesthesiology  Preprocedure Note       Name:  Apolinar Dobson   Age:  50 y.o.  :  1965                                          MRN:  [id number]         Date:  2020      Surgeon: Zaida Bar):  Arabella Jolley MD    Procedure: Procedure(s):  NASAL  BONE CLOSED REDUCTION    Medications prior to admission:   Prior to Admission medications    Medication Sig Start Date End Date Taking? Authorizing Provider   ferrous sulfate (IRON 325) 325 (65 Fe) MG tablet Take 325 mg by mouth 2 times daily   Yes Historical Provider, MD   lamoTRIgine (LAMICTAL) 200 MG tablet Take 200 mg by mouth 2 times daily   Yes Historical Provider, MD   OLANZapine (ZYPREXA) 15 MG tablet Take 15 mg by mouth nightly   Yes Historical Provider, MD   mirtazapine (REMERON) 15 MG tablet Take 15 mg by mouth nightly   Yes Historical Provider, MD   guaiFENesin 400 MG tablet Take 400 mg by mouth 4 times daily as needed for Cough   Yes Historical Provider, MD   cetirizine (ZYRTEC) 10 MG tablet Take 10 mg by mouth daily   Yes Historical Provider, MD   tiotropium (SPIRIVA RESPIMAT) 2.5 MCG/ACT AERS inhaler Inhale 2 puffs into the lungs daily   Yes Historical Provider, MD   ibuprofen (ADVIL;MOTRIN) 800 MG tablet Take 800 mg by mouth every 8 hours as needed for Pain   Yes Historical Provider, MD   loratadine (CLARITIN) 10 MG capsule Take 10 mg by mouth daily as needed   Yes Historical Provider, MD   pantoprazole (PROTONIX) 40 MG tablet Take 40 mg by mouth 2 times daily   Yes Historical Provider, MD   clonazePAM (KLONOPIN) 0.5 MG tablet Take 0.5 mg by mouth nightly.    Yes Historical Provider, MD   valACYclovir (VALTREX) 1 g tablet Take 2,000 mg by mouth 2 times daily   Yes Historical Provider, MD   colestipol (COLESTID) 1 g tablet Take 1 g by mouth 2 times daily   Yes Historical Provider, MD   FLUoxetine (PROZAC) 20 MG capsule Take 20 mg by mouth daily   Yes Historical Provider, MD   lisinopril (PRINIVIL;ZESTRIL) 5 MG tablet Take 5 mg by sennosides-docusate sodium (SENOKOT-S) 8.6-50 MG tablet 1 tablet  1 tablet Oral BID Jovan Bee APRN - CNP   1 tablet at 06/24/20 2054    cephALEXin (KEFLEX) capsule 500 mg  500 mg Oral 4 times per day Jovan Bee APRN - CNP   500 mg at 06/25/20 9139       Allergies: Allergies   Allergen Reactions    Infliximab Anaphylaxis    Adalimumab      infections    Mercaptopurine      Other reaction(s): Other See Comments  Pancreatitis   Also known as 6-mp       Problem List:    Patient Active Problem List   Diagnosis Code    Thoracic spine fracture ([facility] [address] S22.009A    Orbit fracture, left S02.85XA    Nasal bone fracture S02. 2XXA    Fracture of multiple ribs of left side S22.42XA       Past Medical History:        Diagnosis Date    Bipolar 1 disorder ([facility] [address]     Closed fracture of left orbit     COPD (chronic obstructive pulmonary disease) ([facility] [address]     Crohn's colitis ([facility] [address]     Thoracic spine fracture ([facility] [address] 6/22/2020       Past Surgical History:  No past surgical history on file.     Social History:    Social History     Tobacco Use    Smoking status: Current Every Day Smoker     Packs/day: 0.50     Types: Cigarettes     Start date: 6/20/1990    Smokeless tobacco: Never Used   Substance Use Topics    Alcohol use: Never     Frequency: Never     Binge frequency: Never                                Ready to quit: No  Counseling given: No      Vital Signs (Current):   Vitals:    06/24/20 1649 06/24/20 1823 06/24/20 2122 06/25/20 0730   BP:  (!) 198/89  (!) 198/88   Pulse:  81  88   Resp:  18  18   Temp:  36.5 °C (97.7 °F)  36.9 °C (98.5 °F)   TempSrc:  Temporal  Temporal   SpO2: 96% 97% 99% 94%   Weight:       Height:                                                  BP Readings from Last 3 Encounters:   06/25/20 (!) 198/88       NPO Status: Time of last liquid consumption: 2300                        Time of last solid consumption: 2300                        Date of last liquid consumption: 06/24/20 Date of last solid food consumption: 06/24/20    BMI:   Wt Readings from Last 3 Encounters:   06/21/20 146 lb (66.2 kg)     Body mass index is 24.3 kg/m². CBC:   Lab Results   Component Value Date    WBC 5.0 06/25/2020    RBC 3.14 06/25/2020    HGB 8.7 06/25/2020    HCT 27.4 06/25/2020    MCV 87.3 06/25/2020    RDW 15.1 06/25/2020     06/25/2020       CMP:   Lab Results   Component Value Date     06/25/2020    K 4.7 06/25/2020    CL 99 06/25/2020    CO2 22 06/25/2020    BUN 6 06/25/2020    CREATININE 0.5 06/25/2020    GFRAA >60 06/25/2020    LABGLOM >60 06/25/2020    GLUCOSE 87 06/25/2020    PROT 6.6 06/20/2020    CALCIUM 8.9 06/25/2020    BILITOT 0.2 06/20/2020    ALKPHOS 87 06/20/2020    AST 31 06/20/2020    ALT 15 06/20/2020       POC Tests: No results for input(s): POCGLU, POCNA, POCK, POCCL, POCBUN, POCHEMO, POCHCT in the last 72 hours. Coags: No results found for: PROTIME, INR, APTT    HCG (If Applicable): No results found for: PREGTESTUR, PREGSERUM, HCG, HCGQUANT     ABGs: No results found for: PHART, PO2ART, MKK2OFE, JRP5RBA, BEART, [id number]     Type & Screen (If Applicable):  No results found for: LABABO, LABRH    Drug/Infectious Status (If Applicable):  No results found for: HIV, HEPCAB    COVID-19 Screening (If Applicable): No results found for: COVID19     CT chest 6/20/20  Impression       Compression deformity of the T9 vertebral body, with approximately 50%   loss of height. There is involvement of the posterior elements on the   right, with fracture extending into the pars interarticulares.       Multiple left-sided rib fractures from the 3rd through the 6th ribs.       Centrilobular nodules and groundglass opacities asymmetrically   involving the right lung, and mostly the right lower lobe. These have   an infectious/inflammatory appearance, although, aspiration could   account for these findings.  Follow-up to resolution, to rule out any   underlying process (and insure

## 2020-06-25 NOTE — CARE COORDINATION
Dr. Mian Gallardo Pa is PCP. Akil Espana Nurse is getting  order for Pt. Discharge Plan is to return home.    Chyna Simmons, L.S.W.  [phone]

## 2020-07-02 ENCOUNTER — OFFICE VISIT (OUTPATIENT)
Dept: SURGERY | Age: 55
End: 2020-07-02

## 2020-07-02 VITALS — TEMPERATURE: 98.4 F

## 2020-07-02 PROCEDURE — 99024 POSTOP FOLLOW-UP VISIT: CPT | Performed by: PHYSICIAN ASSISTANT

## 2020-07-02 NOTE — PROGRESS NOTES
Subjective: Follow up today from Closed reduction of nasal bones. Denies fever, nausea, vomiting, leg pain or swelling, pain is absent. The patient states that they have  been taking their antibiotic as directed. They state that their pain is  controlled with analgesia. The patient denies any problems with their nasal split and have had minimal drainage from the nose. They state they have been adherent to their post op instructions and restrictions. They present today for a wound check. Objective:    /80  Temp(Src) 97 °F (36.1 °C) (Tympanic)  Ht 5' 10\" (1.778 m)  Wt 205 lb (92.987 kg)  BMI 29.41 kg/m2      Nose: Nasal Splint intact, nasal tip capillary refill intact, sensation to light touch  intact to nasal tip  Eyes: B/L periorbital edema and ecchymosis as expected        Assessment:    Patient Active Problem List   Diagnosis    Nasal deformity, acquired       Plan:     Pain Control PRN  No glasses of sunglass until 6 weeks post post  HOB elevated while at rest  Educated the patient on the extent of their surgery and that edema and ecchymosis are common. Nasal splint removed today  OTC nasal saline spray PRN congestion    Educate the patient and her nose is still broken and that she needs to maintain careful consideration not to bump or hit her nose at this time. I informed her that her surgical intervention was to realign the nasal bones at this time. Patient voices understanding that she will not be healed for another approximate 5 weeks from her nasal bone fracture. F/U in 5 weeks. Call office with concerns or signs of infection.     Linda Stevens

## 2020-08-03 ENCOUNTER — OFFICE VISIT (OUTPATIENT)
Dept: SURGERY | Age: 55
End: 2020-08-03
Payer: MEDICAID

## 2020-08-03 VITALS
HEIGHT: 64 IN | WEIGHT: 138 LBS | SYSTOLIC BLOOD PRESSURE: 142 MMHG | OXYGEN SATURATION: 98 % | TEMPERATURE: 97.8 F | RESPIRATION RATE: 16 BRPM | HEART RATE: 72 BPM | BODY MASS INDEX: 23.56 KG/M2 | DIASTOLIC BLOOD PRESSURE: 88 MMHG

## 2020-08-03 PROCEDURE — 99212 OFFICE O/P EST SF 10 MIN: CPT | Performed by: PHYSICIAN ASSISTANT

## (undated) DEVICE — COUNTER NDL 30 COUNT DBL MAG

## (undated) DEVICE — CODMAN® SURGICAL PATTIES 1/2" X 3" (1.27CM X 7.62CM): Brand: CODMAN®

## (undated) DEVICE — STAPLER SKIN L39MM DIA0.53MM CRWN 5.7MM S STL FIX HD PROX

## (undated) DEVICE — COVER,LIGHT HANDLE,FLX,2/PK: Brand: MEDLINE INDUSTRIES, INC.

## (undated) DEVICE — Z INACTIVE USE 2653177 SPONGE GZ W2XL2IN NONWOVEN 4 PLY FASTER WICKING ABIL AVANT

## (undated) DEVICE — MASTISOL ADHESIVE LIQ 2/3ML

## (undated) DEVICE — SYRINGE MED 10ML TRNSLUC BRL PLUNG BLK MRK POLYPR CTRL

## (undated) DEVICE — STERILE POLYISOPRENE POWDER-FREE SURGICAL GLOVES: Brand: PROTEXIS

## (undated) DEVICE — MARKER,SKIN,WI/RULER AND LABELS: Brand: MEDLINE

## (undated) DEVICE — TOWEL,OR,DSP,ST,BLUE,STD,6/PK,12PK/CS: Brand: MEDLINE

## (undated) DEVICE — Device: Brand: DENVER SPLINT

## (undated) DEVICE — DOUBLE BASIN SET: Brand: MEDLINE INDUSTRIES, INC.

## (undated) DEVICE — GOWN,SIRUS,FABRNF,XL,20/CS: Brand: MEDLINE

## (undated) DEVICE — SURGICAL PROCEDURE PACK EENT CUST

## (undated) DEVICE — TUBING SUCT 12FR MAL ALUM SHFT FN CAP VENT UNIV CONN W/ OBT

## (undated) DEVICE — STANDARD HYPODERMIC NEEDLE,ALUMINUM HUB: Brand: MONOJECT

## (undated) DEVICE — 3M™ STERI-STRIP™ BLEND TONE SKIN CLOSURES, B1551, TAN, 1/4 IN X 3 IN (6 MM X 75 MM), 3 STRIPS/ENVELOPE: Brand: 3M™ STERI-STRIP™